# Patient Record
Sex: FEMALE | Race: WHITE | Employment: UNEMPLOYED | URBAN - METROPOLITAN AREA
[De-identification: names, ages, dates, MRNs, and addresses within clinical notes are randomized per-mention and may not be internally consistent; named-entity substitution may affect disease eponyms.]

---

## 2017-01-01 ENCOUNTER — HOSPITAL ENCOUNTER (INPATIENT)
Age: 47
LOS: 3 days | Discharge: HOSPICE | DRG: 064 | End: 2017-04-25
Attending: HOSPITALIST | Admitting: HOSPITALIST
Payer: SELF-PAY

## 2017-01-01 ENCOUNTER — HOME CARE VISIT (OUTPATIENT)
Dept: HOSPICE | Facility: HOSPICE | Age: 47
End: 2017-01-01

## 2017-01-01 ENCOUNTER — HOSPICE ADMISSION (OUTPATIENT)
Dept: HOSPICE | Facility: HOSPICE | Age: 47
End: 2017-01-01

## 2017-01-01 ENCOUNTER — HOME CARE VISIT (OUTPATIENT)
Dept: SCHEDULING | Facility: HOME HEALTH | Age: 47
End: 2017-01-01

## 2017-01-01 ENCOUNTER — APPOINTMENT (OUTPATIENT)
Dept: CT IMAGING | Age: 47
DRG: 064 | End: 2017-01-01
Attending: PSYCHIATRY & NEUROLOGY
Payer: SELF-PAY

## 2017-01-01 ENCOUNTER — OFFICE VISIT (OUTPATIENT)
Dept: FAMILY MEDICINE CLINIC | Age: 47
End: 2017-01-01

## 2017-01-01 ENCOUNTER — HOSPITAL ENCOUNTER (OUTPATIENT)
Dept: LAB | Age: 47
Discharge: HOME | End: 2017-04-10

## 2017-01-01 ENCOUNTER — TELEPHONE (OUTPATIENT)
Dept: NEUROLOGY | Age: 47
End: 2017-01-01

## 2017-01-01 ENCOUNTER — APPOINTMENT (OUTPATIENT)
Dept: MRI IMAGING | Age: 47
DRG: 064 | End: 2017-01-01
Attending: HOSPITALIST
Payer: SELF-PAY

## 2017-01-01 VITALS
TEMPERATURE: 98.2 F | RESPIRATION RATE: 14 BRPM | BODY MASS INDEX: 20.88 KG/M2 | SYSTOLIC BLOOD PRESSURE: 127 MMHG | WEIGHT: 133 LBS | OXYGEN SATURATION: 96 % | DIASTOLIC BLOOD PRESSURE: 76 MMHG | HEART RATE: 71 BPM | HEIGHT: 67 IN

## 2017-01-01 VITALS
RESPIRATION RATE: 20 BRPM | SYSTOLIC BLOOD PRESSURE: 142 MMHG | HEIGHT: 67 IN | OXYGEN SATURATION: 96 % | HEART RATE: 75 BPM | DIASTOLIC BLOOD PRESSURE: 82 MMHG | TEMPERATURE: 97.1 F | WEIGHT: 133 LBS | BODY MASS INDEX: 20.88 KG/M2

## 2017-01-01 VITALS
OXYGEN SATURATION: 94 % | HEART RATE: 121 BPM | TEMPERATURE: 97.6 F | SYSTOLIC BLOOD PRESSURE: 128 MMHG | DIASTOLIC BLOOD PRESSURE: 100 MMHG | RESPIRATION RATE: 16 BRPM

## 2017-01-01 VITALS
RESPIRATION RATE: 16 BRPM | BODY MASS INDEX: 21.38 KG/M2 | HEIGHT: 66 IN | TEMPERATURE: 97.8 F | HEART RATE: 79 BPM | OXYGEN SATURATION: 100 % | SYSTOLIC BLOOD PRESSURE: 117 MMHG | DIASTOLIC BLOOD PRESSURE: 80 MMHG | WEIGHT: 133 LBS

## 2017-01-01 DIAGNOSIS — I15.9 SECONDARY HYPERTENSION: ICD-10-CM

## 2017-01-01 DIAGNOSIS — I10 ESSENTIAL HYPERTENSION: ICD-10-CM

## 2017-01-01 DIAGNOSIS — I63.512 ACUTE ISCHEMIC LEFT MCA STROKE (HCC): ICD-10-CM

## 2017-01-01 DIAGNOSIS — R53.81 DEBILITY: ICD-10-CM

## 2017-01-01 DIAGNOSIS — R11.14 BILIOUS VOMITING WITH NAUSEA: ICD-10-CM

## 2017-01-01 DIAGNOSIS — R63.30 FEEDING DIFFICULTIES: ICD-10-CM

## 2017-01-01 DIAGNOSIS — I10 ESSENTIAL HYPERTENSION: Primary | ICD-10-CM

## 2017-01-01 DIAGNOSIS — I63.232 CEREBRAL INFARCTION DUE TO OCCLUSION OF LEFT INTERNAL CAROTID ARTERY (HCC): ICD-10-CM

## 2017-01-01 LAB
ABO + RH BLD: NORMAL
ALBUMIN SERPL BCP-MCNC: 3.4 G/DL (ref 3.4–5)
ALBUMIN SERPL BCP-MCNC: 4.3 G/DL (ref 3.4–5)
ALBUMIN/GLOB SERPL: 1.3 {RATIO} (ref 0.8–1.7)
ALBUMIN/GLOB SERPL: 1.4 {RATIO} (ref 0.8–1.7)
ALP SERPL-CCNC: 107 U/L (ref 45–117)
ALP SERPL-CCNC: 132 U/L (ref 45–117)
ALT SERPL-CCNC: 61 U/L (ref 13–56)
ALT SERPL-CCNC: 62 U/L (ref 13–56)
ANION GAP BLD CALC-SCNC: 10 MMOL/L (ref 3–18)
ANION GAP BLD CALC-SCNC: 10 MMOL/L (ref 3–18)
APTT PPP: 42.7 SEC (ref 23–36.4)
AST SERPL W P-5'-P-CCNC: 42 U/L (ref 15–37)
AST SERPL W P-5'-P-CCNC: 64 U/L (ref 15–37)
BASOPHILS # BLD AUTO: 0 K/UL (ref 0–0.06)
BASOPHILS # BLD AUTO: 0 K/UL (ref 0–0.06)
BASOPHILS # BLD: 0 % (ref 0–2)
BASOPHILS # BLD: 0 % (ref 0–2)
BILIRUB SERPL-MCNC: 0.7 MG/DL (ref 0.2–1)
BILIRUB SERPL-MCNC: 0.7 MG/DL (ref 0.2–1)
BLD PROD TYP BPU: NORMAL
BLD PROD TYP BPU: NORMAL
BLOOD GROUP ANTIBODIES SERPL: NORMAL
BPU ID: NORMAL
BPU ID: NORMAL
BUN SERPL-MCNC: 10 MG/DL (ref 7–18)
BUN SERPL-MCNC: 7 MG/DL (ref 7–18)
BUN/CREAT SERPL: 10 (ref 12–20)
BUN/CREAT SERPL: 12 (ref 12–20)
CALCIUM SERPL-MCNC: 8.3 MG/DL (ref 8.5–10.1)
CALCIUM SERPL-MCNC: 8.8 MG/DL (ref 8.5–10.1)
CHLORIDE SERPL-SCNC: 104 MMOL/L (ref 100–108)
CHLORIDE SERPL-SCNC: 96 MMOL/L (ref 100–108)
CHOLEST SERPL-MCNC: 198 MG/DL
CHOLEST SERPL-MCNC: 234 MG/DL
CO2 SERPL-SCNC: 24 MMOL/L (ref 21–32)
CO2 SERPL-SCNC: 29 MMOL/L (ref 21–32)
CREAT SERPL-MCNC: 0.72 MG/DL (ref 0.6–1.3)
CREAT SERPL-MCNC: 0.82 MG/DL (ref 0.6–1.3)
CRP SERPL HS-MCNC: 77.44 MG/L (ref 0–3)
DIFFERENTIAL METHOD BLD: ABNORMAL
DIFFERENTIAL METHOD BLD: ABNORMAL
EOSINOPHIL # BLD: 0 K/UL (ref 0–0.4)
EOSINOPHIL # BLD: 0.3 K/UL (ref 0–0.4)
EOSINOPHIL NFR BLD: 0 % (ref 0–5)
EOSINOPHIL NFR BLD: 3 % (ref 0–5)
ERYTHROCYTE [DISTWIDTH] IN BLOOD BY AUTOMATED COUNT: 13.7 % (ref 11.6–14.5)
ERYTHROCYTE [DISTWIDTH] IN BLOOD BY AUTOMATED COUNT: 13.9 % (ref 11.6–14.5)
ERYTHROCYTE [SEDIMENTATION RATE] IN BLOOD: 10 MM/HR (ref 0–20)
EST. AVERAGE GLUCOSE BLD GHB EST-MCNC: 103 MG/DL
EST. AVERAGE GLUCOSE BLD GHB EST-MCNC: 105 MG/DL
GLOBULIN SER CALC-MCNC: 2.7 G/DL (ref 2–4)
GLOBULIN SER CALC-MCNC: 3.1 G/DL (ref 2–4)
GLUCOSE BLD STRIP.AUTO-MCNC: 113 MG/DL (ref 70–110)
GLUCOSE BLD STRIP.AUTO-MCNC: 120 MG/DL (ref 70–110)
GLUCOSE SERPL-MCNC: 91 MG/DL (ref 74–99)
GLUCOSE SERPL-MCNC: 95 MG/DL (ref 74–99)
HBA1C MFR BLD: 5.2 % (ref 4.2–5.6)
HBA1C MFR BLD: 5.3 % (ref 4.2–5.6)
HCT VFR BLD AUTO: 36.8 % (ref 35–45)
HCT VFR BLD AUTO: 45.1 % (ref 35–45)
HDLC SERPL-MCNC: 41 MG/DL (ref 40–60)
HDLC SERPL-MCNC: 48 MG/DL (ref 40–60)
HDLC SERPL: 4.8 {RATIO} (ref 0–5)
HDLC SERPL: 4.9 {RATIO} (ref 0–5)
HGB BLD-MCNC: 12.8 G/DL (ref 12–16)
HGB BLD-MCNC: 15.6 G/DL (ref 12–16)
INR PPP: 1.2 (ref 0.8–1.2)
LDLC SERPL CALC-MCNC: 131.4 MG/DL (ref 0–100)
LDLC SERPL CALC-MCNC: 159.8 MG/DL (ref 0–100)
LIPID PROFILE,FLP: ABNORMAL
LIPID PROFILE,FLP: ABNORMAL
LYMPHOCYTES # BLD AUTO: 12 % (ref 21–52)
LYMPHOCYTES # BLD AUTO: 19 % (ref 21–52)
LYMPHOCYTES # BLD: 1.6 K/UL (ref 0.9–3.6)
LYMPHOCYTES # BLD: 1.9 K/UL (ref 0.9–3.6)
MAGNESIUM SERPL-MCNC: 2 MG/DL (ref 1.6–2.6)
MCH RBC QN AUTO: 38.1 PG (ref 24–34)
MCH RBC QN AUTO: 38.7 PG (ref 24–34)
MCHC RBC AUTO-ENTMCNC: 34.6 G/DL (ref 31–37)
MCHC RBC AUTO-ENTMCNC: 34.8 G/DL (ref 31–37)
MCV RBC AUTO: 109.5 FL (ref 74–97)
MCV RBC AUTO: 111.9 FL (ref 74–97)
MONOCYTES # BLD: 0.7 K/UL (ref 0.05–1.2)
MONOCYTES # BLD: 0.7 K/UL (ref 0.05–1.2)
MONOCYTES NFR BLD AUTO: 5 % (ref 3–10)
MONOCYTES NFR BLD AUTO: 7 % (ref 3–10)
NEUTS SEG # BLD: 10.9 K/UL (ref 1.8–8)
NEUTS SEG # BLD: 7.1 K/UL (ref 1.8–8)
NEUTS SEG NFR BLD AUTO: 71 % (ref 40–73)
NEUTS SEG NFR BLD AUTO: 83 % (ref 40–73)
PHOSPHATE SERPL-MCNC: 1.9 MG/DL (ref 2.5–4.9)
PLATELET # BLD AUTO: 318 K/UL (ref 135–420)
PLATELET # BLD AUTO: 390 K/UL (ref 135–420)
PMV BLD AUTO: 10.8 FL (ref 9.2–11.8)
PMV BLD AUTO: 9.6 FL (ref 9.2–11.8)
POTASSIUM SERPL-SCNC: 3.6 MMOL/L (ref 3.5–5.5)
POTASSIUM SERPL-SCNC: 4 MMOL/L (ref 3.5–5.5)
PROT SERPL-MCNC: 6.1 G/DL (ref 6.4–8.2)
PROT SERPL-MCNC: 7.4 G/DL (ref 6.4–8.2)
PROTHROMBIN TIME: 14.6 SEC (ref 11.5–15.2)
RBC # BLD AUTO: 3.36 M/UL (ref 4.2–5.3)
RBC # BLD AUTO: 4.03 M/UL (ref 4.2–5.3)
SODIUM SERPL-SCNC: 135 MMOL/L (ref 136–145)
SODIUM SERPL-SCNC: 138 MMOL/L (ref 136–145)
SODIUM SERPL-SCNC: 143 MMOL/L (ref 136–145)
SPECIMEN EXP DATE BLD: NORMAL
STATUS OF UNIT,%ST: NORMAL
STATUS OF UNIT,%ST: NORMAL
T3FREE SERPL-MCNC: 1.8 PG/ML (ref 2.3–4.2)
T4 FREE SERPL-MCNC: 0.8 NG/DL (ref 0.7–1.5)
TRIGL SERPL-MCNC: 128 MG/DL (ref ?–150)
TRIGL SERPL-MCNC: 131 MG/DL (ref ?–150)
TSH SERPL DL<=0.05 MIU/L-ACNC: 0.66 UIU/ML (ref 0.36–3.74)
TSH SERPL DL<=0.05 MIU/L-ACNC: 2.9 UIU/ML (ref 0.36–3.74)
UNIT DIVISION, %UDIV: 0
UNIT DIVISION, %UDIV: 0
VLDLC SERPL CALC-MCNC: 25.6 MG/DL
VLDLC SERPL CALC-MCNC: 26.2 MG/DL
WBC # BLD AUTO: 10 K/UL (ref 4.6–13.2)
WBC # BLD AUTO: 13.3 K/UL (ref 4.6–13.2)

## 2017-01-01 PROCEDURE — 76450000000

## 2017-01-01 PROCEDURE — 84443 ASSAY THYROID STIM HORMONE: CPT | Performed by: FAMILY MEDICINE

## 2017-01-01 PROCEDURE — 83036 HEMOGLOBIN GLYCOSYLATED A1C: CPT | Performed by: FAMILY MEDICINE

## 2017-01-01 PROCEDURE — 0651 HSPC ROUTINE HOME CARE

## 2017-01-01 PROCEDURE — 36415 COLL VENOUS BLD VENIPUNCTURE: CPT | Performed by: HOSPITALIST

## 2017-01-01 PROCEDURE — 65270000029 HC RM PRIVATE

## 2017-01-01 PROCEDURE — 83036 HEMOGLOBIN GLYCOSYLATED A1C: CPT | Performed by: HOSPITALIST

## 2017-01-01 PROCEDURE — 74011000258 HC RX REV CODE- 258: Performed by: HOSPITALIST

## 2017-01-01 PROCEDURE — G0156 HHCP-SVS OF AIDE,EA 15 MIN: HCPCS

## 2017-01-01 PROCEDURE — 84439 ASSAY OF FREE THYROXINE: CPT | Performed by: HOSPITALIST

## 2017-01-01 PROCEDURE — 82962 GLUCOSE BLOOD TEST: CPT

## 2017-01-01 PROCEDURE — 85730 THROMBOPLASTIN TIME PARTIAL: CPT | Performed by: HOSPITALIST

## 2017-01-01 PROCEDURE — 70551 MRI BRAIN STEM W/O DYE: CPT

## 2017-01-01 PROCEDURE — 84481 FREE ASSAY (FT-3): CPT | Performed by: HOSPITALIST

## 2017-01-01 PROCEDURE — C9113 INJ PANTOPRAZOLE SODIUM, VIA: HCPCS | Performed by: HOSPITALIST

## 2017-01-01 PROCEDURE — 77030018719 HC DRSG PTCH ANTIMIC J&J -A

## 2017-01-01 PROCEDURE — 74011250636 HC RX REV CODE- 250/636: Performed by: HOSPITALIST

## 2017-01-01 PROCEDURE — 85025 COMPLETE CBC W/AUTO DIFF WBC: CPT | Performed by: FAMILY MEDICINE

## 2017-01-01 PROCEDURE — G0155 HHCP-SVS OF CSW,EA 15 MIN: HCPCS

## 2017-01-01 PROCEDURE — C1751 CATH, INF, PER/CENT/MIDLINE: HCPCS

## 2017-01-01 PROCEDURE — 85652 RBC SED RATE AUTOMATED: CPT | Performed by: HOSPITALIST

## 2017-01-01 PROCEDURE — 86900 BLOOD TYPING SEROLOGIC ABO: CPT | Performed by: HOSPITALIST

## 2017-01-01 PROCEDURE — 80053 COMPREHEN METABOLIC PANEL: CPT | Performed by: HOSPITALIST

## 2017-01-01 PROCEDURE — A6212 FOAM DRG <=16 SQ IN W/BORDER: HCPCS

## 2017-01-01 PROCEDURE — 77030020245 HC SOL INJ 5% D/0.9%NACL

## 2017-01-01 PROCEDURE — G0299 HHS/HOSPICE OF RN EA 15 MIN: HCPCS

## 2017-01-01 PROCEDURE — 84443 ASSAY THYROID STIM HORMONE: CPT | Performed by: HOSPITALIST

## 2017-01-01 PROCEDURE — 80061 LIPID PANEL: CPT | Performed by: FAMILY MEDICINE

## 2017-01-01 PROCEDURE — 84295 ASSAY OF SERUM SODIUM: CPT | Performed by: HOSPITALIST

## 2017-01-01 PROCEDURE — HOSPICE MEDICATION HC HH HOSPICE MEDICATION

## 2017-01-01 PROCEDURE — 36569 INSJ PICC 5 YR+ W/O IMAGING: CPT | Performed by: HOSPITALIST

## 2017-01-01 PROCEDURE — 76937 US GUIDE VASCULAR ACCESS: CPT | Performed by: HOSPITALIST

## 2017-01-01 PROCEDURE — 74011000250 HC RX REV CODE- 250: Performed by: HOSPITALIST

## 2017-01-01 PROCEDURE — 92610 EVALUATE SWALLOWING FUNCTION: CPT

## 2017-01-01 PROCEDURE — 83735 ASSAY OF MAGNESIUM: CPT | Performed by: HOSPITALIST

## 2017-01-01 PROCEDURE — 80061 LIPID PANEL: CPT | Performed by: HOSPITALIST

## 2017-01-01 PROCEDURE — 84100 ASSAY OF PHOSPHORUS: CPT | Performed by: HOSPITALIST

## 2017-01-01 PROCEDURE — 70450 CT HEAD/BRAIN W/O DYE: CPT

## 2017-01-01 PROCEDURE — 85025 COMPLETE CBC W/AUTO DIFF WBC: CPT | Performed by: HOSPITALIST

## 2017-01-01 PROCEDURE — 85610 PROTHROMBIN TIME: CPT | Performed by: HOSPITALIST

## 2017-01-01 PROCEDURE — 77030018786 HC NDL GD F/USND BARD -B

## 2017-01-01 PROCEDURE — 65610000009 HC RM ICU NEURO

## 2017-01-01 PROCEDURE — 3336500001 HSPC ELECTION

## 2017-01-01 PROCEDURE — 80053 COMPREHEN METABOLIC PANEL: CPT | Performed by: FAMILY MEDICINE

## 2017-01-01 PROCEDURE — 74011250637 HC RX REV CODE- 250/637: Performed by: HOSPITALIST

## 2017-01-01 PROCEDURE — 86141 C-REACTIVE PROTEIN HS: CPT | Performed by: HOSPITALIST

## 2017-01-01 RX ORDER — BACITRACIN 500 UNIT/G
1 PACKET (EA) TOPICAL AS NEEDED
Status: DISCONTINUED | OUTPATIENT
Start: 2017-01-01 | End: 2017-01-01

## 2017-01-01 RX ORDER — ACETAMINOPHEN 325 MG/1
650 TABLET ORAL
Status: DISCONTINUED | OUTPATIENT
Start: 2017-01-01 | End: 2017-01-01

## 2017-01-01 RX ORDER — HYDROCHLOROTHIAZIDE 12.5 MG/1
25 CAPSULE ORAL DAILY
COMMUNITY
End: 2017-01-01

## 2017-01-01 RX ORDER — DEXTROSE MONOHYDRATE AND SODIUM CHLORIDE 5; .9 G/100ML; G/100ML
0.75 INJECTION, SOLUTION INTRAVENOUS CONTINUOUS
Status: DISCONTINUED | OUTPATIENT
Start: 2017-01-01 | End: 2017-01-01

## 2017-01-01 RX ORDER — SCOLOPAMINE TRANSDERMAL SYSTEM 1 MG/1
1.5 PATCH, EXTENDED RELEASE TRANSDERMAL
Status: DISCONTINUED | OUTPATIENT
Start: 2017-01-01 | End: 2017-01-01 | Stop reason: HOSPADM

## 2017-01-01 RX ORDER — ACETAMINOPHEN 650 MG/1
650 SUPPOSITORY RECTAL
Status: DISCONTINUED | OUTPATIENT
Start: 2017-01-01 | End: 2017-01-01 | Stop reason: HOSPADM

## 2017-01-01 RX ORDER — PROMETHAZINE HYDROCHLORIDE 25 MG/1
25 TABLET ORAL
COMMUNITY
End: 2017-01-01

## 2017-01-01 RX ORDER — GLYCOPYRROLATE 0.2 MG/ML
0.2 INJECTION INTRAMUSCULAR; INTRAVENOUS
Status: DISCONTINUED | OUTPATIENT
Start: 2017-01-01 | End: 2017-01-01 | Stop reason: HOSPADM

## 2017-01-01 RX ORDER — SCOLOPAMINE TRANSDERMAL SYSTEM 1 MG/1
1.5 PATCH, EXTENDED RELEASE TRANSDERMAL
Qty: 10 PATCH | Refills: 0 | Status: SHIPPED | OUTPATIENT
Start: 2017-01-01

## 2017-01-01 RX ORDER — LIDOCAINE HYDROCHLORIDE 10 MG/ML
1-5 INJECTION INFILTRATION; PERINEURAL
Status: COMPLETED | OUTPATIENT
Start: 2017-01-01 | End: 2017-01-01

## 2017-01-01 RX ORDER — ALPRAZOLAM 1 MG/1
1 TABLET ORAL 3 TIMES DAILY
COMMUNITY
End: 2017-01-01

## 2017-01-01 RX ORDER — ONDANSETRON 2 MG/ML
1 INJECTION INTRAMUSCULAR; INTRAVENOUS
Status: DISCONTINUED | OUTPATIENT
Start: 2017-01-01 | End: 2017-01-01

## 2017-01-01 RX ORDER — AMOXICILLIN 250 MG
2 CAPSULE ORAL
Status: DISCONTINUED | OUTPATIENT
Start: 2017-01-01 | End: 2017-01-01

## 2017-01-01 RX ORDER — ALBUTEROL SULFATE 0.83 MG/ML
2.5 SOLUTION RESPIRATORY (INHALATION)
Status: DISCONTINUED | OUTPATIENT
Start: 2017-01-01 | End: 2017-01-01

## 2017-01-01 RX ORDER — SODIUM CHLORIDE 0.9 % (FLUSH) 0.9 %
20 SYRINGE (ML) INJECTION EVERY 24 HOURS
Status: DISCONTINUED | OUTPATIENT
Start: 2017-01-01 | End: 2017-01-01

## 2017-01-01 RX ORDER — PROMETHAZINE HYDROCHLORIDE 25 MG/1
25 TABLET ORAL
Qty: 20 TAB | Refills: 0 | Status: SHIPPED | OUTPATIENT
Start: 2017-01-01 | End: 2017-01-01

## 2017-01-01 RX ORDER — MANNITOL 20 G/100ML
76.3 INJECTION, SOLUTION INTRAVENOUS CONTINUOUS
Status: DISCONTINUED | OUTPATIENT
Start: 2017-01-01 | End: 2017-01-01

## 2017-01-01 RX ORDER — HALOPERIDOL 2 MG/ML
2 SOLUTION ORAL
Qty: 30 ML | Refills: 0 | Status: SHIPPED | OUTPATIENT
Start: 2017-01-01

## 2017-01-01 RX ORDER — ZOLPIDEM TARTRATE 10 MG/1
TABLET ORAL
COMMUNITY
End: 2017-01-01

## 2017-01-01 RX ORDER — SODIUM CHLORIDE 0.9 % (FLUSH) 0.9 %
10-30 SYRINGE (ML) INJECTION AS NEEDED
Status: DISCONTINUED | OUTPATIENT
Start: 2017-01-01 | End: 2017-01-01

## 2017-01-01 RX ORDER — SODIUM CHLORIDE 0.9 % (FLUSH) 0.9 %
10 SYRINGE (ML) INJECTION EVERY 24 HOURS
Status: DISCONTINUED | OUTPATIENT
Start: 2017-01-01 | End: 2017-01-01

## 2017-01-01 RX ORDER — POTASSIUM CHLORIDE 7.45 MG/ML
10 INJECTION INTRAVENOUS
Status: DISCONTINUED | OUTPATIENT
Start: 2017-01-01 | End: 2017-01-01

## 2017-01-01 RX ORDER — ASPIRIN 325 MG
325 TABLET ORAL DAILY
Status: DISCONTINUED | OUTPATIENT
Start: 2017-01-01 | End: 2017-01-01

## 2017-01-01 RX ORDER — POTASSIUM CHLORIDE 29.8 MG/ML
20 INJECTION INTRAVENOUS
Status: DISCONTINUED | OUTPATIENT
Start: 2017-01-01 | End: 2017-01-01

## 2017-01-01 RX ORDER — MORPHINE SULFATE 20 MG/ML
10 SOLUTION ORAL
Status: DISCONTINUED | OUTPATIENT
Start: 2017-01-01 | End: 2017-01-01

## 2017-01-01 RX ORDER — SODIUM CHLORIDE 0.9 % (FLUSH) 0.9 %
10-40 SYRINGE (ML) INJECTION EVERY 8 HOURS
Status: DISCONTINUED | OUTPATIENT
Start: 2017-01-01 | End: 2017-01-01

## 2017-01-01 RX ORDER — MORPHINE SULFATE 20 MG/ML
5 SOLUTION ORAL
Status: DISCONTINUED | OUTPATIENT
Start: 2017-01-01 | End: 2017-01-01 | Stop reason: HOSPADM

## 2017-01-01 RX ORDER — POTASSIUM CHLORIDE, DEXTROSE MONOHYDRATE AND SODIUM CHLORIDE 300; 5; 900 MG/100ML; G/100ML; MG/100ML
INJECTION, SOLUTION INTRAVENOUS CONTINUOUS
Status: DISCONTINUED | OUTPATIENT
Start: 2017-01-01 | End: 2017-01-01

## 2017-01-01 RX ORDER — PROMETHAZINE HYDROCHLORIDE 25 MG/1
25 SUPPOSITORY RECTAL
Qty: 30 SUPPOSITORY | Refills: 0 | Status: SHIPPED | OUTPATIENT
Start: 2017-01-01 | End: 2017-05-02

## 2017-01-01 RX ORDER — SODIUM CHLORIDE 0.9 % (FLUSH) 0.9 %
10 SYRINGE (ML) INJECTION AS NEEDED
Status: DISCONTINUED | OUTPATIENT
Start: 2017-01-01 | End: 2017-01-01

## 2017-01-01 RX ORDER — ATORVASTATIN CALCIUM 40 MG/1
80 TABLET, FILM COATED ORAL
Status: DISCONTINUED | OUTPATIENT
Start: 2017-01-01 | End: 2017-01-01

## 2017-01-01 RX ORDER — HYDROCHLOROTHIAZIDE 25 MG/1
25 TABLET ORAL DAILY
Qty: 30 TAB | Refills: 5 | Status: SHIPPED | OUTPATIENT
Start: 2017-01-01 | End: 2017-01-01

## 2017-01-01 RX ORDER — LORAZEPAM 2 MG/ML
1 CONCENTRATE ORAL
Status: DISCONTINUED | OUTPATIENT
Start: 2017-01-01 | End: 2017-01-01 | Stop reason: HOSPADM

## 2017-01-01 RX ORDER — ONDANSETRON 2 MG/ML
4 INJECTION INTRAMUSCULAR; INTRAVENOUS
Status: DISCONTINUED | OUTPATIENT
Start: 2017-01-01 | End: 2017-01-01 | Stop reason: HOSPADM

## 2017-01-01 RX ORDER — HALOPERIDOL 2 MG/ML
2 SOLUTION ORAL
Status: DISCONTINUED | OUTPATIENT
Start: 2017-01-01 | End: 2017-01-01 | Stop reason: HOSPADM

## 2017-01-01 RX ORDER — ENOXAPARIN SODIUM 100 MG/ML
40 INJECTION SUBCUTANEOUS EVERY 24 HOURS
Status: DISCONTINUED | OUTPATIENT
Start: 2017-01-01 | End: 2017-01-01

## 2017-01-01 RX ORDER — MORPHINE SULFATE 20 MG/ML
5 SOLUTION ORAL
Qty: 30 ML | Refills: 0 | Status: SHIPPED | OUTPATIENT
Start: 2017-01-01

## 2017-01-01 RX ORDER — LORAZEPAM 2 MG/ML
1 CONCENTRATE ORAL
Qty: 30 ML | Refills: 0 | Status: SHIPPED | OUTPATIENT
Start: 2017-01-01

## 2017-01-01 RX ORDER — PROMETHAZINE HYDROCHLORIDE 25 MG/1
25 SUPPOSITORY RECTAL
Status: DISCONTINUED | OUTPATIENT
Start: 2017-01-01 | End: 2017-01-01 | Stop reason: HOSPADM

## 2017-01-01 RX ORDER — HYDROCHLOROTHIAZIDE 25 MG/1
25 TABLET ORAL DAILY
Qty: 30 TAB | Refills: 0 | Status: SHIPPED | OUTPATIENT
Start: 2017-01-01 | End: 2017-01-01 | Stop reason: SDUPTHER

## 2017-01-01 RX ORDER — SERTRALINE HYDROCHLORIDE 100 MG/1
100 TABLET, FILM COATED ORAL 2 TIMES DAILY
COMMUNITY
End: 2017-01-01

## 2017-01-01 RX ADMIN — SODIUM CHLORIDE 40 MG: 9 INJECTION INTRAMUSCULAR; INTRAVENOUS; SUBCUTANEOUS at 09:27

## 2017-01-01 RX ADMIN — Medication 20 ML: at 14:00

## 2017-01-01 RX ADMIN — MANNITOL: 20 INJECTION, SOLUTION INTRAVENOUS at 01:33

## 2017-01-01 RX ADMIN — LIDOCAINE HYDROCHLORIDE 2 ML: 10 INJECTION, SOLUTION INFILTRATION; PERINEURAL at 12:40

## 2017-01-01 RX ADMIN — FOLIC ACID: 5 INJECTION, SOLUTION INTRAMUSCULAR; INTRAVENOUS; SUBCUTANEOUS at 09:00

## 2017-01-01 RX ADMIN — DEXTROSE MONOHYDRATE, SODIUM CHLORIDE, AND POTASSIUM CHLORIDE 1000 ML: 50; 9; 2.98 INJECTION, SOLUTION INTRAVENOUS at 09:27

## 2017-01-01 RX ADMIN — ENOXAPARIN SODIUM 40 MG: 40 INJECTION SUBCUTANEOUS at 00:22

## 2017-01-01 RX ADMIN — POTASSIUM CHLORIDE 10 MEQ: 7.46 INJECTION, SOLUTION INTRAVENOUS at 09:27

## 2017-01-01 RX ADMIN — POTASSIUM CHLORIDE 10 MEQ: 7.46 INJECTION, SOLUTION INTRAVENOUS at 12:00

## 2017-01-01 RX ADMIN — DEXTROSE MONOHYDRATE AND SODIUM CHLORIDE 0.74 ML/KG/HR: 5; .9 INJECTION, SOLUTION INTRAVENOUS at 06:34

## 2017-01-01 RX ADMIN — SODIUM CHLORIDE 0.25 ML/KG/HR: 3 INJECTION, SOLUTION INTRAVENOUS at 06:32

## 2017-01-01 RX ADMIN — POTASSIUM CHLORIDE 10 MEQ: 7.46 INJECTION, SOLUTION INTRAVENOUS at 13:00

## 2017-01-01 RX ADMIN — SODIUM CHLORIDE 40 MG: 9 INJECTION INTRAMUSCULAR; INTRAVENOUS; SUBCUTANEOUS at 00:22

## 2017-01-01 RX ADMIN — DEXTROSE MONOHYDRATE AND SODIUM CHLORIDE 0.75 ML/KG/HR: 5; .9 INJECTION, SOLUTION INTRAVENOUS at 22:28

## 2017-01-01 RX ADMIN — Medication 10 ML: at 14:00

## 2017-02-17 NOTE — TELEPHONE ENCOUNTER
Requesting Hydrochlorothiazide refill to carry her over until she reports to [facility] as a walk-in. on  2/27/17  .

## 2017-04-10 PROBLEM — I10 ESSENTIAL HYPERTENSION: Status: ACTIVE | Noted: 2017-01-01

## 2017-04-10 NOTE — PROGRESS NOTES
PHQ 6- Active diagnosis of depression-  In treatment    No chief complaint on file. 1. Have you been to the ER, urgent care clinic since your last visit? Hospitalized since your last visit? No    2. Have you seen or consulted any other health care providers outside of the [address] [place] [address] since your last visit? No    3. When was your last Pap smear? 5/2016 - No results of file    When was your last Mammogram? 5/2016  When was your last Colon screening? 2012    PMH/FH/Social Hx reviewed and updated as needed      Applicable screenings reviewed and updated as needed  Medication reconciliation performed. Patient does need medication refills. Health Maintenance reviewed.       * Patient to complete VICKY

## 2017-04-10 NOTE — PATIENT INSTRUCTIONS
Winnie Stafford have been referred for specialty care at  [address] [place] [address] in [place]. Services are provided at a sliding scale rate based on your income. Please call [phone] or [phone] to start the screening process. You can leave them a message with your information, and you will receive a call back. They will only try to contact you two times, so if you miss the call or have not heard from them 2 weeks after your call, please call and leave another message. Once you receive your letter of acceptance, bring it to the [place] [address] and we can schedule your appointment. Call [employer] at [phone]. They will screen you for any insurance you might qualify for. This is the first step before you can receive discounts at the [facility] or [employer] specialists. Additional contact numbers for [facility] are below:   For [place]/[facility] patients: [phone]  For [place]/[facility] patients: [phone]  For [place]/[facility] patients: [phone]

## 2017-04-10 NOTE — MR AVS SNAPSHOT
Visit Information Date & Time Provider Department Dept. Phone Encounter #  
 4/10/2017  9:30 AM Laura Bethea [place] [address] [phone] [id number] Upcoming Health Maintenance Date Due Pneumococcal 19-64 Medium Risk (1 of 1 - PPSV23) 4/16/1987 DTaP/Tdap/Td series (1 - Tdap) 4/16/1989 PAP AKA CERVICAL CYTOLOGY 4/16/1989 INFLUENZA AGE 9 TO ADULT 8/1/2016 Allergies as of 4/10/2017  Review Complete On: 4/10/2017 By: Meghan Mtz Severity Noted Reaction Type Reactions Lisinopril  10/24/2016    Cough Penicillins  10/24/2016    Rash Current Immunizations  Never Reviewed No immunizations on file. Not reviewed this visit You Were Diagnosed With   
  
 Codes Comments Essential hypertension    -  Primary ICD-10-CM: I10 
ICD-9-CM: 401.9 Secondary hypertension     ICD-10-CM: I15.9 ICD-9-CM: 405.99 Bilious vomiting with nausea     ICD-10-CM: R11.14 
ICD-9-CM: 787.04 Vitals BP Pulse Temp Resp Height(growth percentile) Weight(growth percentile) 117/80 (BP 1 Location: Right arm, BP Patient Position: Sitting) 79 97.8 °F (36.6 °C) 16 5' 6\" (1.676 m) 133 lb (60.3 kg) LMP SpO2 BMI OB Status Smoking Status 03/28/2017 (Approximate) 100% 21.47 kg/m2 Premenopausal Current Every Day Smoker BMI and BSA Data Body Mass Index Body Surface Area  
 21.47 kg/m 2 1.68 m 2 Preferred Pharmacy Pharmacy Name Phone Jennifer Teresa, [address] [facility] [phone] Your Updated Medication List  
  
   
This list is accurate as of: 4/10/17 10:04 AM.  Always use your most recent med list.  
  
  
  
  
 AMBIEN 10 mg tablet Generic drug:  zolpidem Take  by mouth nightly as needed. hydroCHLOROthiazide 25 mg tablet Commonly known as:  HYDRODIURIL Take 1 Tab by mouth daily. LaMICtal 100 mg tablet Generic drug:  lamoTRIgine Take 200 mg by mouth daily. promethazine 25 mg tablet Commonly known as:  PHENERGAN Take 1 Tab by mouth every six (6) hours as needed for Nausea. XANAX 0.5 mg tablet Generic drug:  ALPRAZolam  
Take  by mouth. ZOLOFT 100 mg tablet Generic drug:  sertraline Take  by mouth daily. Prescriptions Sent to Pharmacy Refills  
 hydroCHLOROthiazide (HYDRODIURIL) 25 mg tablet 5 Sig: Take 1 Tab by mouth daily. Class: Normal  
 Pharmacy: [address] [place] [address] Saul [facility] [address] [facility] Ph #: [phone] Route: Oral  
 promethazine (PHENERGAN) 25 mg tablet 0 Sig: Take 1 Tab by mouth every six (6) hours as needed for Nausea. Class: Normal  
 Pharmacy: [address] [place] [address] [facility] [address] [facility] Ph #: [phone] Route: Oral  
  
Patient Instructions Sidney Barthel You have been referred for specialty care at  [address] [place] [address] in [place]. Services are provided at a sliding scale rate based on your income. Please call [phone] or [phone] to start the screening process. You can leave them a message with your information, and you will receive a call back. They will only try to contact you two times, so if you miss the call or have not heard from them 2 weeks after your call, please call and leave another message. Once you receive your letter of acceptance, bring it to the [place] [address] and we can schedule your appointment. Call [employer] at [phone]. They will screen you for any insurance you might qualify for. This is the first step before you can receive discounts at the hospital or [facility] specialists. Additional contact numbers for [facility] are below: For [place]/[facility] patients: [phone] For [place]/[facility] patients: [phone] For [place]/[facility] patients: [phone] Introducing [facility]! Anahi Adams introduces goOutMap patient portal. Now you can access parts of your medical record, email your doctor's office, and request medication refills online. 1. In your internet browser, go to [email or web address] 2. Click on the First Time User? Click Here link in the Sign In box. You will see the New Member Sign Up page. 3. Enter your goOutMap Access Code exactly as it appears below. You will not need to use this code after youve completed the sign-up process. If you do not sign up before the expiration date, you must request a new code. · goOutMap Access Code: [email or web address] Expires: 7/9/2017 10:04 AM 
 
4. Enter the last four digits of your Social Security Number (xxxx) and Date of Birth (mm/dd/yyyy) as indicated and click Submit. You will be taken to the next sign-up page. 5. Create a goOutMap ID. This will be your goOutMap login ID and cannot be changed, so think of one that is secure and easy to remember. 6. Create a goOutMap password. You can change your password at any time. 7. Enter your Password Reset Question and Answer. This can be used at a later time if you forget your password. 8. Enter your e-mail address. You will receive e-mail notification when new information is available in [address] 9. Click Sign Up. You can now view and download portions of your medical record. 10. Click the Download Summary menu link to download a portable copy of your medical information. If you have questions, please visit the Frequently Asked Questions section of the goOutMap website. Remember, goOutMap is NOT to be used for urgent needs. For medical emergencies, dial 911. Now available from your iPhone and Android! Please provide this summary of care documentation to your next provider. Your primary care clinician is listed as NONE. If you have any questions after today's visit, please call [phone].

## 2017-04-10 NOTE — PROGRESS NOTES
HPI  Nancy Rea is a 48 y.o. female being seen today for   Chief Complaint   Patient presents with    Follow Up Chronic Condition     Monitor and Medicate HTN   . she states that she vomited yesterday and nauseated since then but keeping down fluids. Needs bp med refill. Loads of stress lately due to finances and family issues. Also has history of elevated wbc. Per pt used to see hematologist but counts normalized and she has not followed up. Also has follow up gyn exam scheduled with [facility] due to cervical inflammation on her last exam.         Past Medical History:   Diagnosis Date    Alcoholism ([address]     Anemia NEC     Anxiety     Arthralgia     Constipation     Depression     Fatigue     for more than 11 years    Fibromyalgia     GERD (gastroesophageal reflux disease)     Hypercholesterolemia     Hypertension     Leukocytosis     Migraines     Nodule of left lung     on CT scan         ROS  Patient states that she is feeling well. Denies complaints of chest pain, shortness of breath, swelling of legs, dizziness or weakness. Current Outpatient Prescriptions   Medication Sig    hydroCHLOROthiazide (HYDRODIURIL) 25 mg tablet Take 1 Tab by mouth daily.  promethazine (PHENERGAN) 25 mg tablet Take 1 Tab by mouth every six (6) hours as needed for Nausea.  sertraline (ZOLOFT) 100 mg tablet Take  by mouth daily.  lamoTRIgine (LAMICTAL) 100 mg tablet Take 200 mg by mouth daily.  ALPRAZolam (XANAX) 0.5 mg tablet Take  by mouth.  zolpidem (AMBIEN) 10 mg tablet Take  by mouth nightly as needed. No current facility-administered medications for this visit.         PE  Visit Vitals    /80 (BP 1 Location: Right arm, BP Patient Position: Sitting)    Pulse 79    Temp 97.8 °F (36.6 °C)    Resp 16    Ht 5' 6\" (1.676 m)    Wt 133 lb (60.3 kg)    LMP 03/28/2017 (Approximate)    SpO2 100%    BMI 21.47 kg/m2        Alert and oriented with normal mood and affect. she is well developed and well nourished . Lungs are clear without wheezing. Heart rate is regular without murmurs or gallops. There is no lower extremity edema. No results found for this or any previous visit. Assessment and Plan:        ICD-10-CM ICD-9-CM    1. Essential hypertension X06 905.8 METABOLIC PANEL, COMPREHENSIVE      TSH 3RD GENERATION      LIPID PANEL      CBC WITH AUTOMATED DIFF      HEMOGLOBIN A1C WITH EAG   2. Secondary hypertension I15.9 405.99 hydroCHLOROthiazide (HYDRODIURIL) 25 mg tablet   3. Bilious vomiting with nausea R11.14 787.04 promethazine (PHENERGAN) 25 mg tablet     Refill bp med and phnergan. If nausea persists, consider cutting hctz in half until better. Start financial screening for bshsi gyn.   Pt will call us if she needs referral.       Timo Navas MD

## 2017-04-10 NOTE — PROGRESS NOTES
Discharge instructions reviewed with patient    Medication list and understanding of medications reviewed with patient. OTC and herbal medications reviewed and added to med list if applicable  Barriers to adherence assessed. Guidance given regarding new medications this visit, including reason for taking this medicine, and common side effects.     Labs drawn, Given AVS.

## 2017-04-22 PROBLEM — I63.512 ACUTE ISCHEMIC LEFT MCA STROKE (HCC): Status: ACTIVE | Noted: 2017-01-01

## 2017-04-22 PROBLEM — I63.522 ACUTE LEFT ACA ISCHEMIC STROKE (HCC): Status: ACTIVE | Noted: 2017-01-01

## 2017-04-22 PROBLEM — I63.232 CEREBRAL INFARCTION DUE TO OCCLUSION OF LEFT INTERNAL CAROTID ARTERY (HCC): Status: ACTIVE | Noted: 2017-01-01

## 2017-04-22 NOTE — IP AVS SNAPSHOT
Current Discharge Medication List  
  
START taking these medications Dose & Instructions Dispensing Information Comments Morning Noon Evening Bedtime  
 haloperidol 2 mg/mL oral concentrate Commonly known as:  HALDOL Your last dose was: Your next dose is:    
   
   
 Dose:  2 mg Take 1 mL by mouth every four (4) hours as needed. Quantity:  30 mL Refills:  0 LORazepam 2 mg/mL concentrated solution Commonly known as:  INTENSOL Your last dose was: Your next dose is:    
   
   
 Dose:  1 mg Take 0.5 mL by mouth every four (4) hours as needed. Max Daily Amount: 6 mg. Quantity:  30 mL Refills:  0  
     
   
   
   
  
 morphine 100 mg/5 mL (20 mg/mL) concentrated solution Commonly known as:  Zo  Your last dose was: Your next dose is:    
   
   
 Dose:  5 mg Take 0.25 mL by mouth every two (2) hours as needed. Max Daily Amount: 60 mg.  
 Quantity:  30 mL Refills:  0  
     
   
   
   
  
 promethazine 25 mg suppository Commonly known as:  PHENERGAN Replaces:  promethazine 25 mg tablet Your last dose was: Your next dose is:    
   
   
 Dose:  25 mg Insert 1 Suppository into rectum every six (6) hours as needed for up to 7 days. Quantity:  30 Suppository Refills:  0  
     
   
   
   
  
 scopolamine 1.5 mg (1 mg over 3 days) Pt3d Commonly known as:  TRANSDERM-SCOP Your last dose was: Your next dose is:    
   
   
 Dose:  1.5 mg  
1 Patch by TransDERmal route every seventy-two (72) hours. Quantity:  10 Patch Refills:  0 CONTINUE these medications which have NOT CHANGED Dose & Instructions Dispensing Information Comments Morning Noon Evening Bedtime ZOLOFT 100 mg tablet Generic drug:  sertraline Your last dose was:     
   
Your next dose is:    
   
   
 Dose:  100 mg  
 Take 100 mg by mouth two (2) times a day. Indications: ANXIETY WITH DEPRESSION Refills:  0 STOP taking these medications AMBIEN 10 mg tablet Generic drug:  zolpidem  
   
  
 aspirin delayed-release 325 mg tablet  
   
  
 atorvastatin 80 mg tablet Commonly known as:  LIPITOR  
   
  
 hydroCHLOROthiazide 12.5 mg capsule Commonly known as:  MICROZIDE  
   
  
 levETIRAcetam in NaCl (iso-os) 1,000 mg/100 mL Pgbk IVPB premix Commonly known as:  KEPPRA  
   
  
 mannitol 20 % infusion Commonly known as:  OSMITROL  
   
  
 promethazine 25 mg tablet Commonly known as:  PHENERGAN Replaced by:  promethazine 25 mg suppository XANAX 1 mg tablet Generic drug:  ALPRAZolam  
   
  
  
  
Where to Get Your Medications Information on where to get these meds will be given to you by the nurse or doctor. ! Ask your nurse or doctor about these medications  
  haloperidol 2 mg/mL oral concentrate LORazepam 2 mg/mL concentrated solution  
 morphine 100 mg/5 mL (20 mg/mL) concentrated solution  
 promethazine 25 mg suppository  
 scopolamine 1.5 mg (1 mg over 3 days) Pt3d

## 2017-04-22 NOTE — TELEPHONE ENCOUNTER
50year-old female with aphasia and right hemiparesis. Dr. Nahum Gomez called me about this patient at [facility]. See his note for details. Briefly, the patient was last known at baseline at 11pm last night. This morning she had aphasia and right hemiparesis. She was brought to the [facility] ED.  NIHSS 18. -130/70-80. Personal review of her neuroimaging studies shows CT acute stroke in the LACA anterior frontal branch and LMCA superior division territories. There was preservation in part of the LMCA inferior divison and LACA pericallosal and callosomarginal regions. CTA LICA tapers to no filling in the proximal cervical to the terminus into the LA1 and LM1. The ACoA and distal LACA fill. The LMCA inferior division has some patchy preservation. This could be an LICA dissection with tapering morphology but often it is just proximal arterial collapse after an embolism to the terminus into the LMCA and LACA. She may have more territory at risk especially language comprehension and primary sensorimotor function. An emergent MRI wakeup stroke protocol may help determine if there is hypoperfused brain that might be saved. It would be very high risk to revascularize these potentially ischemic but not infarcted territories as the more anterior areas of established infarct would also likely be revascularized. This would result in significant reperfusion injury with increasing mass effect from cerebral edema and/or hemorrhagic transformation. Usually this mass effect is refractory to medical therapy and requires decompressive hemicraniectomy and duraplasty for relief. Nonetheless, with the size of the current infarct and possibility of additional regions of ischemia turning to infarct she may require a hemicraniectomy anyway to survive. Transferring the patient to the [facility] NSICU for neurocritical care is reasonable with or without endovascular revasculariztion and/or hemicraniectomy.    Discussed with Dr. Gwen Duff and also Dr. Gailna Joya who had contacted me.

## 2017-04-22 NOTE — IP AVS SNAPSHOT
[address] [place] [address] 
[phone] Patient: Coy Acosta MRN: [id number] :1970 You are allergic to the following Allergen Reactions Lisinopril Cough Penicillins Rash Recent Documentation Height Weight BMI OB Status Smoking Status 1.702 m 60.3 kg 20.83 kg/m2 Premenopausal Current Every Day Smoker Emergency Contacts Name Discharge Info Relation Home Work Mobile Redd De La Rosa  Mother [14] [phone] Redd De La Rosa  Parent [1] [phone] About your hospitalization You were admitted on:  2017 You last received care in the:  [address] [facility] You were discharged on:  2017 Unit phone number:  [phone] Why you were hospitalized Your primary diagnosis was:  Cerebral Infarction Due To Occlusion Of Left Internal Carotid Artery (Hcc) Your diagnoses also included:  Acute Ischemic Left Mca Stroke (Hcc), Acute Left Sincere Ischemic Stroke (Hcc), Essential Hypertension, Hypercholesterolemia, Gerd (Gastroesophageal Reflux Disease), Smoking, Cerebral Edema (Hcc), Brain Compression (Hcc), Feeding Difficulties, Debility Providers Seen During Your Hospitalizations Provider Role Specialty Primary office phone Melo Fonseca DO Attending Provider Internal Medicine [phone] Nilson Montiel MD Attending Provider Internal Medicine [phone] Jose Martin Cortés MD Attending Provider Internal Medicine [phone] Your Primary Care Physician (PCP) Primary Care Physician Office Phone Office Fax Uriel Basilio [phone] Follow-up Information Follow up With Details Comments Contact Info Aneta Martinez NP   [id number] 
[phone] Current Discharge Medication List  
  
START taking these medications Dose & Instructions Dispensing Information Comments Morning Noon Evening Bedtime  
 haloperidol 2 mg/mL oral concentrate Commonly known as:  HALDOL Your last dose was: Your next dose is:    
   
   
 Dose:  2 mg Take 1 mL by mouth every four (4) hours as needed. Quantity:  30 mL Refills:  0 LORazepam 2 mg/mL concentrated solution Commonly known as:  INTENSOL Your last dose was: Your next dose is:    
   
   
 Dose:  1 mg Take 0.5 mL by mouth every four (4) hours as needed. Max Daily Amount: 6 mg. Quantity:  30 mL Refills:  0  
     
   
   
   
  
 morphine 100 mg/5 mL (20 mg/mL) concentrated solution Commonly known as:  Agnieszka Miller Your last dose was: Your next dose is:    
   
   
 Dose:  5 mg Take 0.25 mL by mouth every two (2) hours as needed. Max Daily Amount: 60 mg.  
 Quantity:  30 mL Refills:  0  
     
   
   
   
  
 promethazine 25 mg suppository Commonly known as:  PHENERGAN Replaces:  promethazine 25 mg tablet Your last dose was: Your next dose is:    
   
   
 Dose:  25 mg Insert 1 Suppository into rectum every six (6) hours as needed for up to 7 days. Quantity:  30 Suppository Refills:  0  
     
   
   
   
  
 scopolamine 1.5 mg (1 mg over 3 days) Pt3d Commonly known as:  TRANSDERM-SCOP Your last dose was: Your next dose is:    
   
   
 Dose:  1.5 mg  
1 Patch by TransDERmal route every seventy-two (72) hours. Quantity:  10 Patch Refills:  0 CONTINUE these medications which have NOT CHANGED Dose & Instructions Dispensing Information Comments Morning Noon Evening Bedtime ZOLOFT 100 mg tablet Generic drug:  sertraline Your last dose was: Your next dose is:    
   
   
 Dose:  100 mg Take 100 mg by mouth two (2) times a day. Indications: ANXIETY WITH DEPRESSION Refills:  0 STOP taking these medications AMBIEN 10 mg tablet Generic drug:  zolpidem  
   
  
 aspirin delayed-release 325 mg tablet  
   
  
 atorvastatin 80 mg tablet Commonly known as:  LIPITOR  
   
  
 hydroCHLOROthiazide 12.5 mg capsule Commonly known as:  MICROZIDE  
   
  
 levETIRAcetam in NaCl (iso-os) 1,000 mg/100 mL Pgbk IVPB premix Commonly known as:  KEPPRA  
   
  
 mannitol 20 % infusion Commonly known as:  OSMITROL  
   
  
 promethazine 25 mg tablet Commonly known as:  PHENERGAN Replaced by:  promethazine 25 mg suppository XANAX 1 mg tablet Generic drug:  ALPRAZolam  
   
  
  
  
Where to Get Your Medications Information on where to get these meds will be given to you by the nurse or doctor. ! Ask your nurse or doctor about these medications  
  haloperidol 2 mg/mL oral concentrate LORazepam 2 mg/mL concentrated solution  
 morphine 100 mg/5 mL (20 mg/mL) concentrated solution  
 promethazine 25 mg suppository  
 scopolamine 1.5 mg (1 mg over 3 days) Pt3d Discharge Instructions DISCHARGE SUMMARY from Nurse The following personal items are in your possession at time of discharge: 
 
Dental Appliances: None Visual Aid: None Home Medications: Sent home Jewelry: None Clothing: None Other Valuables: None PATIENT INSTRUCTIONS: 
 
 
F-face looks uneven A-arms unable to move or move unevenly S-speech slurred or non-existent T-time-call 911 as soon as signs and symptoms begin-DO NOT go Back to bed or wait to see if you get better-TIME IS BRAIN. Warning Signs of HEART ATTACK Call 911 if you have these symptoms: 
? Chest discomfort.  Most heart attacks involve discomfort in the center of the chest that lasts more than a few minutes, or that goes away and comes back. It can feel like uncomfortable pressure, squeezing, fullness, or pain. ? Discomfort in other areas of the upper body. Symptoms can include pain or discomfort in one or both arms, the back, neck, jaw, or stomach. ? Shortness of breath with or without chest discomfort. ? Other signs may include breaking out in a cold sweat, nausea, or lightheadedness. Don't wait more than five minutes to call [address]! Fast action can save your life. Calling 911 is almost always the fastest way to get lifesaving treatment. Emergency Medical Services staff can begin treatment when they arrive  up to an hour sooner than if someone gets to the hospital by car. The discharge information has been reviewed with the patient. The patient verbalized understanding. Discharge medications reviewed with the patient and appropriate educational materials and side effects teaching were provided. Transient Ischemic Attack: Care Instructions Your Care Instructions A transient ischemic attack (TIA) is when blood flow to a part of your brain is blocked for a short time. A TIA is like a stroke but usually lasts only a few minutes. A TIA does not cause lasting brain damage. Any vision problems, slurred speech, or other symptoms usually go away in 10 to 20 minutes. But they may last for up to 24 hours. TIAs are often warning signs of a stroke. Some people who have a TIA may have a stroke in the future. A stroke can cause symptoms like those of a TIA. But a stroke causes lasting damage to your brain. You can take steps to help prevent a stroke. One thing you can do is get early treatment. If you have other new symptoms, or if your symptoms do not get better, go back to the emergency room or call your doctor right away. Getting treatment right away may prevent long-term brain damage caused by a stroke. The doctor has checked you carefully, but problems can develop later.  If you notice any problems or new symptoms, get medical treatment right away. Follow-up care is a key part of your treatment and safety. Be sure to make and go to all appointments, and call your doctor if you are having problems. It's also a good idea to know your test results and keep a list of the medicines you take. How can you care for yourself at home? Medicines · Be safe with medicines. Take your medicines exactly as prescribed. Call your doctor if you think you are having a problem with your medicine. · If you take a blood thinner, such as aspirin, be sure you get instructions about how to take your medicine safely. Blood thinners can cause serious bleeding problems. · Call your doctor if you are not able to take your medicines for any reason. · Do not take any over-the-counter medicines or herbal products without talking to your doctor first. 
· If you take birth control pills or hormone therapy, talk to your doctor. Ask if these treatments are right for you. Lifestyle changes · Do not smoke. If you need help quitting, talk to your doctor about stop-smoking programs and medicines. · Be active. If your doctor recommends it, get more exercise. Walking is a good choice. Bit by bit, increase the amount you walk every day. Try for at least 30 minutes on most days of the week. You also may want to swim, bike, or do other activities. · Eat heart-healthy foods. These include fruits, vegetables, high-fiber foods, fish, and foods that are low in sodium, saturated fat, and trans fat. · Stay at a healthy weight. Lose weight if you need to. · Limit alcohol to 2 drinks a day for men and 1 drink a day for women. Staying healthy · Manage other health problems such as diabetes, high blood pressure, and high cholesterol. · Get the flu vaccine every year. When should you call for help? Call 911 anytime you think you may need emergency care. For example, call if: · You have new or worse symptoms of a stroke. These may include: 
¨ Sudden numbness, tingling, weakness, or loss of movement in your face, arm, or leg, especially on only one side of your body. ¨ Sudden vision changes. ¨ Sudden trouble speaking. ¨ Sudden confusion or trouble understanding simple statements. ¨ Sudden problems with walking or balance. ¨ A sudden, severe headache that is different from past headaches. Call 911 even if these symptoms go away in a few minutes. · You feel like you are having another TIA. Watch closely for changes in your health, and be sure to contact your doctor if you have any problems. Where can you learn more? Go to http://[employer] ([employer]: [place])britton.info/. Enter [phone] in the search box to learn more about \"Transient Ischemic Attack: Care Instructions. \" Current as of: June 4, 2016 Content Version: 11.2 © 0867-4520 [employer]. Care instructions adapted under license by [employer] (which disclaims liability or warranty for this information). If you have questions about a medical condition or this instruction, always ask your healthcare professional. [place] [address] any warranty or liability for your use of this information. Learning About a Hemorrhagic Stroke What is a hemorrhagic stroke? When you have a hemorrhagic (say \"txk-fnj-DC-jick\") stroke, it means that a blood vessel in the brain has burst open or has started to leak. When the blood spills into the space inside and around the brain, it damages nearby nerve cells. This is different from an ischemic (say \"bmv-JPH-mfpb\") stroke, which happens when a blood clot blocks a blood vessel in the brain. The brain damage from a stroke starts within minutes. Quick treatment can help limit damage to the brain and make recovery more likely.  
People who have had a stroke may have a hard time talking, understanding things, and making decisions. They may have to relearn daily activities, such as how to eat, bathe, and dress. How well someone recovers from a stroke depends on how quickly the person gets to the hospital, where in the brain the stroke happened, and how severe it was. Stroke rehabilitation, which includes training and therapy, also helps people recover. What are the symptoms? If you have any of these symptoms, call 911 or other emergency services right away. · You have symptoms of a stroke. These may include: 
¨ Sudden numbness, tingling, weakness, or loss of movement in your face, arm, or leg, especially on only one side of your body. ¨ Sudden vision changes. ¨ Sudden trouble speaking. ¨ Sudden confusion or trouble understanding simple statements. ¨ Sudden problems with walking or balance. ¨ A sudden, severe headache that is different from past headaches. See your doctor if you have symptoms that seem like a stroke, even if they go away quickly. You may have had a transient ischemic attack (TIA), sometimes called a mini-stroke. A TIA is a warning that a stroke may happen soon. Getting early treatment for a TIA can help prevent a stroke. What causes a hemorrhagic stroke? A hemorrhagic stroke happens to blood vessels that have been weakened. The most common causes of weakened blood vessels in the brain are: · A brain aneurysm. This is a bulging, weak part of a blood vessel. It can put pressure on nerves, or it can bleed or break open (rupture). · A brain AVM. This is an abnormal knot of weak blood vessels that some people are born with. · Head injury. · Chronic uncontrolled high blood pressure. Blood pressure that is too high over the long term increases your risk for stroke. · Very high blood pressure. Very high blood pressure that comes on suddenly is dangerous. It is a medical emergency.  
Anticoagulant and antiplatelet medicines can also cause bleeding in the brain. These medicines, also called blood thinners, increase the time it takes for a blood clot to form. How is hemorrhagic stroke treated? Emergency treatment is done to stop the bleeding and prevent damage to the brain. · You may need surgery to repair an aneurysm or to remove the blood that has built up inside the brain. · You may be given medicine to stop the bleeding. · You will be closely watched for signs of increased pressure on the brain. These signs include restlessness, confusion, trouble following commands, and headache. · You may take medicine to manage high blood pressure. Ask your doctor if a stroke rehab program is right for you. Rehab increases your chances of getting back some of the abilities you lost. 
Follow-up care is a key part of your treatment and safety. Be sure to make and go to all appointments, and call your doctor if you are having problems. It's also a good idea to know your test results and keep a list of the medicines you take. How can you prevent another stroke? · Work with your doctor to treat health problems, such as high blood pressure, that raise your chances of having another stroke. · Be safe with medicines. Take your medicine exactly as prescribed. Call your doctor if you think you are having a problem with your medicine. · Have a healthy lifestyle. ¨ Do not smoke or allow others to smoke around you. If you need help quitting, talk to your doctor about stop-smoking programs and medicines. These can increase your chances of quitting for good. Smoking makes a stroke more likely. ¨ Limit alcohol to 2 drinks a day for men and 1 drink a day for women. ¨ Be active. Ask your doctor what type and level of activity is safe for you. ¨ Eat heart-healthy foods, like fruits, vegetables, and high-fiber foods. ¨ Stay at a healthy weight. Lose weight if you need to. Where can you learn more? Go to http://diego-britton.info/. Enter R416 in the search box to learn more about \"Learning About a Hemorrhagic Stroke. \" Current as of: 2016 Content Version: 11.2 © 7332-6637 [address]. Care instructions adapted under license by [employer] (which disclaims liability or warranty for this information). If you have questions about a medical condition or this instruction, always ask your healthcare professional. [address] any warranty or liability for your use of this information. [employer] Activation Thank you for requesting access to [email or web address]. Please follow the instructions below to securely access and download your online medical record. [email or web address] allows you to send messages to your doctor, view your test results, renew your prescriptions, schedule appointments, and more. How Do I Sign Up? 1. In your internet browser, go to [email or web address] 
2. Click on the First Time User? Click Here link in the Sign In box. You will be redirect to the New Member Sign Up page. 3. Enter your [email or web address] Access Code exactly as it appears below. You will not need to use this code after youve completed the sign-up process. If you do not sign up before the expiration date, you must request a new code. [email or web address] Access Code: Activation code not generated Current [email or web address] Status: Active (This is the date your [email or web address] access code will ) 4. Enter the last four digits of your Social Security Number (xxxx) and Date of Birth (mm/dd/yyyy) as indicated and click Submit. You will be taken to the next sign-up page. 5. Create a [email or web address] ID. This will be your [email or web address] login ID and cannot be changed, so think of one that is secure and easy to remember. 6. Create a [email or web address] password. You can change your password at any time. 7. Enter your Password Reset Question and Answer. This can be used at a later time if you forget your password. 8. Enter your e-mail address. You will receive e-mail notification when new information is available in [address] 9. Click Sign Up. You can now view and download portions of your medical record. 10. Click the Download Summary menu link to download a portable copy of your medical information. Additional Information If you have questions, please visit the Frequently Asked Questions section of the [facility] website at [email or web address] Remember, MyChart is NOT to be used for urgent needs. For medical emergencies, dial 911. Patient armband removed and shredded Discharge Orders None [facility] Announcement We are excited to announce that we are making your provider's discharge notes available to you in [facility]. You will see these notes when they are completed and signed by the physician that discharged you from your recent hospital stay. If you have any questions or concerns about any information you see in [facility], please call the Health Information Department where you were seen or reach out to your Primary Care Provider for more information about your plan of care. Introducing [facility]! Dear Darya Diver: Thank you for requesting a [facility] account. Our records indicate that you already have an active [facility] account. You can access your account anytime at [email or web address] [facility]/[employer] Did you know that you can access your hospital and ER discharge instructions at any time in [facility]? You can also review all of your test results from your hospital stay or ER visit. Additional Information If you have questions, please visit the Frequently Asked Questions section of the [facility] website at [email or web address] Remember, MyChart is NOT to be used for urgent needs. For medical emergencies, dial 911. Now available from your iPhone and Android! General Information Please provide this summary of care documentation to your next provider. Patient Signature:  ____________________________________________________________ Date:  ____________________________________________________________  
  
Tinnie Has Provider Signature:  ____________________________________________________________ Date:  ____________________________________________________________

## 2017-04-23 PROBLEM — E78.00 HYPERCHOLESTEROLEMIA: Status: ACTIVE | Noted: 2017-01-01

## 2017-04-23 PROBLEM — G93.6 CEREBRAL EDEMA (HCC): Status: ACTIVE | Noted: 2017-01-01

## 2017-04-23 PROBLEM — F17.200 SMOKING: Status: ACTIVE | Noted: 2017-01-01

## 2017-04-23 PROBLEM — G93.5 BRAIN COMPRESSION (HCC): Status: ACTIVE | Noted: 2017-01-01

## 2017-04-23 PROBLEM — K21.9 GERD (GASTROESOPHAGEAL REFLUX DISEASE): Status: ACTIVE | Noted: 2017-01-01

## 2017-04-23 NOTE — ACP (ADVANCE CARE PLANNING)
Patient has is unable to designate anyone to participate in his/her discharge plan and to receive any needed information at this time. Mother THOM.     Name:   Aakash Mc  Address:  Phone number:  [phone] / [phone]

## 2017-04-23 NOTE — ROUTINE PROCESS
Left PICC inserted utilizing 3CG for SVC tip verification. Released for use, ARISTEO Esposito notified.

## 2017-04-23 NOTE — CONSULTS
Brief Note  51 yo with occluded left ICA and assoc infarct. Dysphasic with dense right hemiparesis.   - spoke with mother and sister  - on board for possible hemicraniectomy

## 2017-04-23 NOTE — PROGRESS NOTES
Patient arrived on the unit from 2601 via bed. Patient is alert, resting in bed, in stable condition. Patient does not appear to be in any pain or discomfort.

## 2017-04-23 NOTE — PROGRESS NOTES
Orders canceled and patient moved to comfort measures acknowledged orders than you for asking us to work with patient .

## 2017-04-23 NOTE — PROGRESS NOTES
Neurovascular Progress Note      Patient: Delmar Page MRN: [id number]  SSN: [id number]    YOB: 1970  Age: 50 y.o. Sex: female      Events  Patient arrived from [facility] to [facility]. MRI performed about 23 hours from last seen at baseline. The study shows the LACA and LMCA acute infarct now includes the posterior portion of the respective vascular territories as well as the previous anterior portion. The LMCA temporal and inferior parietal territories are preserved likely from LPCA collaterals. The [address] territory may also have infarct from origin occlusion. There are no other vascular territories of acute infarct to suggest an embolic shower. There is no hemorrhagic transformation at present. Cerebral edema with mass effect narrowing the sylvian fissure and cortical sulci is developing with about 6mm of left to right midline shift at the level of the foramina of [place].       Vital Signs  Patient Vitals for the past 24 hrs:   Temp Pulse Resp BP SpO2   04/23/17 0200 - 77 17 112/72 95 %   04/23/17 0100 - 80 17 120/73 97 %   04/23/17 0001 - 80 15 - 98 %   04/23/17 0000 99 °F (37.2 °C) 81 20 117/72 95 %   04/22/17 2300 - 80 17 111/74 96 %   04/22/17 2223 - 80 18 - 96 %   04/22/17 2221 - - - 122/76 -   04/22/17 2101 - 89 20 128/77 96 %   04/22/17 2100 - 88 19 - 96 %   04/22/17 2032 - 83 18 115/71 95 %   04/22/17 2008 - 79 17 - 96 %       NIHSS Flow Sheet  Total: 27 (04/23/17 0200)     Vent         Intake and Output    Intake/Output Summary (Last 24 hours) at 04/23/17 0351  Last data filed at 04/23/17 0200   Gross per 24 hour   Intake           618.57 ml   Output              600 ml   Net            18.57 ml       Data    Recent Results (from the past 24 hour(s))   CBC W/O DIFF    Collection Time: 04/22/17 10:43 AM   Result Value Ref Range    WBC 19.6 (H) 4.0 - 11.0 1000/mm3    RBC 3.75 3.60 - 5.20 M/uL    HGB 14.3 13.0 - 17.2 gm/dl    HCT 40.6 37.0 - 50.0 %    .3 (H) 80.0 - 98.0 fL    MCH 38.1 (H) 25.4 - 34.6 pg    MCHC 35.2 30.0 - 36.0 gm/dl    PLATELET 427 634 - 286 1000/mm3    MPV 9.6 6.0 - 10.0 fL    RDW-SD 53.1 (H) 36.4 - 46.3     POC CHEM8    Collection Time: 04/22/17 10:44 AM   Result Value Ref Range    Sodium 134 (L) 136 - 145 mEq/L    Potassium 6.6 (HH) 3.5 - 4.9 mEq/L    Chloride 98 98 - 107 mEq/L    CO2, TOTAL 28 21 - 32 mmol/L    Glucose 97 74 - 106 mg/dL    BUN 8 7 - 25 mg/dl    Creatinine 0.7 0.6 - 1.3 mg/dl    HCT 45 38 - 45 %    HGB 15.3 13.0 - 17.2 gm/dl    CALCIUM,IONIZED 3.90 (L) 4.40 - 8.66 mg/dL   METABOLIC PANEL, BASIC    Collection Time: 04/22/17 11:44 AM   Result Value Ref Range    Sodium 138 136 - 145 mEq/L    Potassium 3.4 (L) 3.5 - 5.1 mEq/L    Chloride 101 98 - 107 mEq/L    CO2 28 21 - 32 mEq/L    Glucose 99 74 - 106 mg/dl    BUN 7 7 - 25 mg/dl    Creatinine 0.8 0.6 - 1.3 mg/dl    GFR est AA >60.0      GFR est non-AA >60      Calcium 8.7 8.5 - 10.1 mg/dl   POC CHEM8    Collection Time: 04/22/17 11:44 AM   Result Value Ref Range    Sodium 139 136 - 145 mEq/L    Potassium 3.4 (L) 3.5 - 4.9 mEq/L    Chloride 98 98 - 107 mEq/L    CO2, TOTAL 26 21 - 32 mmol/L    Glucose 92 74 - 106 mg/dL    BUN 6 (L) 7 - 25 mg/dl    Creatinine 0.7 0.6 - 1.3 mg/dl    HCT 43 38 - 45 %    HGB 14.6 13.0 - 17.2 gm/dl    CALCIUM,IONIZED 4.50 4.40 - 5.40 mg/dL   POC URINE MACROSCOPIC    Collection Time: 04/22/17 11:44 AM   Result Value Ref Range    Glucose Negative NEGATIVE,Negative mg/dl    Bilirubin Negative NEGATIVE,Negative      Ketone Negative NEGATIVE,Negative mg/dl    Specific gravity 1.015 1.005 - 1.030      Blood Trace-lysed (A) NEGATIVE,Negative      pH (UA) 7.5 5 - 9      Protein Negative NEGATIVE,Negative mg/dl    Urobilinogen 0.2 0.0 - 1.0 EU/dl    Nitrites Negative NEGATIVE,Negative      Leukocyte Esterase Negative NEGATIVE,Negative      Color Yellow      Appearance Clear     LACTIC ACID, PLASMA    Collection Time: 04/22/17 11:58 AM   Result Value Ref Range    Lactic Acid 1.5 0.4 - 2.0 mmol/L GLUCOSE, POC    Collection Time: 04/22/17  1:43 PM   Result Value Ref Range    Glucose (POC) 92 65 - 105 mg/dL   DRUG SCREEN, URINE    Collection Time: 04/22/17  1:50 PM   Result Value Ref Range    Amphetamine NEGATIVE NEGATIVE      Barbiturates NEGATIVE NEGATIVE      Benzodiazepines POSITIVE (A) NEGATIVE      Cocaine NEGATIVE NEGATIVE      Marijuana NEGATIVE NEGATIVE      Methadone NEGATIVE NEGATIVE      Opiates NEGATIVE NEGATIVE      Phencyclidine NEGATIVE NEGATIVE     LIPID PANEL    Collection Time: 04/22/17 10:45 PM   Result Value Ref Range    LIPID PROFILE          Cholesterol, total 198 <200 MG/DL    Triglyceride 128 <150 MG/DL    HDL Cholesterol 41 40 - 60 MG/DL    LDL, calculated 131.4 (H) 0 - 100 MG/DL    VLDL, calculated 25.6 MG/DL    CHOL/HDL Ratio 4.8 0 - 5.0     HEMOGLOBIN A1C WITH EAG    Collection Time: 04/22/17 10:45 PM   Result Value Ref Range    Hemoglobin A1c 5.3 4.2 - 5.6 %    Est. average glucose 105 mg/dL   SED RATE (ESR)    Collection Time: 04/22/17 10:45 PM   Result Value Ref Range    Sed rate, automated 10 0 - 20 mm/hr   TSH 3RD GENERATION    Collection Time: 04/22/17 10:45 PM   Result Value Ref Range    TSH 0.66 0.36 - 3.74 uIU/mL   T3, FREE    Collection Time: 04/22/17 10:45 PM   Result Value Ref Range    Triiodothyronine (T3), free 1.8 (L) 2.3 - 4.2 PG/ML   T4, FREE    Collection Time: 04/22/17 10:45 PM   Result Value Ref Range    T4, Free 0.8 0.7 - 1.5 NG/DL          Assessment/Plan  50year-old female with LICA occlusion causing a large left cerebral hemisphere ischemic stroke. There is some preservation of the left temporal lobe and left inferior parietal lobe. Perhaps that is why she able to follow commands versus some language function in the other hemisphere. There is no role for acute neuroendovascular revascularization. Mass effect is starting to increase. Mannitol was initiated at [facility].   Switching to hypertonic saline protocol for less rebound effects may be helpful if she is to get through this with medical management alone. However, it is unlikely that medical management will be sufficient for the expected mass effect and a decompressive left hemicraniectomy with duraplasty will likely be the only way to save her life and chance for some neurologic recovery possibly including some language comprehension. NSICU AIS order set. Do not allow BP to go to high to hasten cerebral edema but not too low to lose collateral perfusion. Consider CVL or PICC for hypertonic protocol which can be initiated through at PIV. Total fluid goal restriction 60ml/h. Pre op labs. No antithrombotic agents. Allocate platelets (2 units) to be given on the way to the hemicraniectomy (if she needs it) to reverse the effects of aspirin which was already given. CT head w/o contrast 8 hours after MRI. Consult general neurology and CCM for continued management as there no acute neuroendovascular needs. . Please reconsult if any neuroendovascular needs arise. Discussed with Dr. Chon Long and Dr. Jeremiah Scott.       Mady Banerjee MD

## 2017-04-23 NOTE — PROGRESS NOTES
Neurology Consult Note  Admit Date: 4/22/2017  Length of Stay: 1  Primary Care: Shola Mosley NP   Principle Problem: Cerebral infarction due to occlusion of left internal carotid artery [facility])     Assessment/Plan    Massive Left ICA Stroke  Family discussion with Dr. Jeri Fonseca as detailed below. They agree she would not want to live with these severe deficits and do not wish to pursue hemicraniectomy. Dr. Jeri Fonseca said he will call the floor team and palliative care to get involved. Hypertonic saline to be stopped and the plan is for the patient to move to the floor.     :       History: This is a 50year old female transferred for the possibility of neurovascular intervention who suffered a devastating total Left ICA stroke. By the time of arrival the infarct was complete and Dr Jeri Fonseca felt there was no interventional role available. I saw the patient with Dr. Jeri Fonseca with her , mother and daughter at bedside. Dr. Jeri Fonseca and I spoke with the , mother and daughter who all agreed that the patient would not want to live with these devastating deficits. In light of this they wish not to pursue hemicraniectomy and would want the patient to be DNR. Results reviewed:   MRI Brain: left ICA stroke  Discussed with: Dr. Jeri Fonseca. Allergies:    Allergies   Allergen Reactions    Lisinopril Cough    Penicillins Rash      Review of Systems: Unable to obtain due to aphasia      PMH:   Past Medical History:   Diagnosis Date    Alcoholism ([facility] [address]     Anemia NEC     Anxiety     Arthralgia     Cerebral infarction due to occlusion of left internal carotid artery (HCC) 4/22/2017    Constipation     Depression     Fatigue     for more than 11 years    Fibromyalgia     GERD (gastroesophageal reflux disease)     Hypercholesterolemia     Hypertension     Leukocytosis     Migraines     Nodule of left lung     on CT scan        Problem List: Principal Problem:    LICA occlusion with ischemia and infarct (4/22/2017)    Active Problems:    Essential hypertension (4/10/2017)      LACA occlusion with ischemia and infarct (4/22/2017)      LMCA occlusion with ischemia and infarct (4/22/2017)      Hypercholesterolemia (4/23/2017)      GERD (gastroesophageal reflux disease) (4/23/2017)      Smoking (4/23/2017)      Cerebral edema ([address] (4/23/2017)      Brain compression ([address] (4/23/2017)        FH:   Family History   Problem Relation Age of Onset    COPD Father     High Cholesterol Mother     Other Mother      Reflux    Cancer Other      colon        SH:   Social History     Social History    Marital status: LEGALLY      Spouse name: N/A    Number of children: N/A    Years of education: N/A     Social History Main Topics    Smoking status: Current Every Day Smoker     Packs/day: 0.50    Smokeless tobacco: Current User    Alcohol use No      Comment: Currently on cessation.  Drug use: No    Sexual activity: No     Other Topics Concern    None     Social History Narrative          Vital Signs:   Visit Vitals    /76    Pulse 83    Temp 100.1 °F (37.8 °C)    Resp 18    Ht 5' 7\" (1.702 m)    Wt 60.6 kg (133 lb 9.6 oz)    SpO2 98%    BMI 20.92 kg/m2      Neurological examination:     Opens eyes to verbal stim. PERRL 4mm--> 2mm VOR's intact. Blink to VT in left field. Right hemianopsia to VT. Right flaccid hemiparesis. Withdrawals left arm and leg to noxious stim. Globally aphasic.            Medications:      [x] REVIEWED  Current Facility-Administered Medications   Medication Dose Route Frequency    hypertonic saline 3 % (NEURO SCIENCE USE ONLY) infusion  0.25-1 mL/kg/hr IntraVENous TITRATE    dextrose 5% - 0.9% NaCl with KCl 40 mEq/L infusion   IntraVENous CONTINUOUS    bacitracin 500 unit/gram packet 1 Packet  1 Packet Topical PRN    sodium chloride (NS) flush 10-30 mL  10-30 mL InterCATHeter PRN    sodium chloride (NS) flush 10 mL  10 mL InterCATHeter Q24H    sodium chloride (NS) flush 10 mL  10 mL InterCATHeter PRN    sodium chloride (NS) flush 10-40 mL  10-40 mL InterCATHeter Q8H    sodium chloride (NS) flush 20 mL  20 mL InterCATHeter Q24H    dextrose 5% and 0.9% NaCl infusion  0.75 mL/kg/hr IntraVENous CONTINUOUS    ondansetron (ZOFRAN) injection 1 mg  1 mg IntraVENous Q6H PRN    atorvastatin (LIPITOR) tablet 80 mg  80 mg Oral QHS    acetaminophen (TYLENOL) tablet 650 mg  650 mg Oral Q4H PRN    acetaminophen (TYLENOL) suppository 650 mg  650 mg Rectal Q4H PRN    senna-docusate (PERICOLACE) 8.6-50 mg per tablet 2 Tab  2 Tab Oral QHS    pantoprazole (PROTONIX) 40 mg in sodium chloride 0.9 % 10 mL injection  40 mg IntraVENous Q12H    albuterol (PROVENTIL VENTOLIN) nebulizer solution 2.5 mg  2.5 mg Nebulization Y2Z PRN    folic acid (FOLVITE) 1 mg, thiamine (B-1) 100 mg in 0.9% sodium chloride 50 mL ivpb   IntraVENous DAILY      Data:      [x] REVIEWED  Recent Results (from the past 24 hour(s))   LIPID PANEL    Collection Time: 04/22/17 10:45 PM   Result Value Ref Range    LIPID PROFILE          Cholesterol, total 198 <200 MG/DL    Triglyceride 128 <150 MG/DL    HDL Cholesterol 41 40 - 60 MG/DL    LDL, calculated 131.4 (H) 0 - 100 MG/DL    VLDL, calculated 25.6 MG/DL    CHOL/HDL Ratio 4.8 0 - 5.0     HEMOGLOBIN A1C WITH EAG    Collection Time: 04/22/17 10:45 PM   Result Value Ref Range    Hemoglobin A1c 5.3 4.2 - 5.6 %    Est. average glucose 105 mg/dL   SED RATE (ESR)    Collection Time: 04/22/17 10:45 PM   Result Value Ref Range    Sed rate, automated 10 0 - 20 mm/hr   TSH 3RD GENERATION    Collection Time: 04/22/17 10:45 PM   Result Value Ref Range    TSH 0.66 0.36 - 3.74 uIU/mL   T3, FREE    Collection Time: 04/22/17 10:45 PM   Result Value Ref Range    Triiodothyronine (T3), free 1.8 (L) 2.3 - 4.2 PG/ML   T4, FREE    Collection Time: 04/22/17 10:45 PM   Result Value Ref Range    T4, Free 0.8 0.7 - 1.5 NG/DL   GLUCOSE, POC    Collection Time: 04/23/17  3:43 AM Result Value Ref Range    Glucose (POC) 120 (H) 70 - 110 mg/dL   PROTHROMBIN TIME + INR    Collection Time: 04/23/17  4:30 AM   Result Value Ref Range    Prothrombin time 14.6 11.5 - 15.2 sec    INR 1.2 0.8 - 1.2     PTT    Collection Time: 04/23/17  4:30 AM   Result Value Ref Range    aPTT 42.7 (H) 23.0 - 36.4 SEC   TYPE & SCREEN    Collection Time: 04/23/17  4:30 AM   Result Value Ref Range    Crossmatch Expiration 04/26/2017     ABO/Rh(D) A POSITIVE     Antibody screen NEG    CBC WITH AUTOMATED DIFF    Collection Time: 04/23/17  4:30 AM   Result Value Ref Range    WBC 13.3 (H) 4.6 - 13.2 K/uL    RBC 3.36 (L) 4.20 - 5.30 M/uL    HGB 12.8 12.0 - 16.0 g/dL    HCT 36.8 35.0 - 45.0 %    .5 (H) 74.0 - 97.0 FL    MCH 38.1 (H) 24.0 - 34.0 PG    MCHC 34.8 31.0 - 37.0 g/dL    RDW 13.7 11.6 - 14.5 %    PLATELET 190 633 - 029 K/uL    MPV 9.6 9.2 - 11.8 FL    NEUTROPHILS 83 (H) 40 - 73 %    LYMPHOCYTES 12 (L) 21 - 52 %    MONOCYTES 5 3 - 10 %    EOSINOPHILS 0 0 - 5 %    BASOPHILS 0 0 - 2 %    ABS. NEUTROPHILS 10.9 (H) 1.8 - 8.0 K/UL    ABS. LYMPHOCYTES 1.6 0.9 - 3.6 K/UL    ABS. MONOCYTES 0.7 0.05 - 1.2 K/UL    ABS. EOSINOPHILS 0.0 0.0 - 0.4 K/UL    ABS. BASOPHILS 0.0 0.0 - 0.06 K/UL    DF AUTOMATED     METABOLIC PANEL, COMPREHENSIVE    Collection Time: 04/23/17  4:30 AM   Result Value Ref Range    Sodium 138 136 - 145 mmol/L    Potassium 3.6 3.5 - 5.5 mmol/L    Chloride 104 100 - 108 mmol/L    CO2 24 21 - 32 mmol/L    Anion gap 10 3.0 - 18 mmol/L    Glucose 95 74 - 99 mg/dL    BUN 7 7.0 - 18 MG/DL    Creatinine 0.72 0.6 - 1.3 MG/DL    BUN/Creatinine ratio 10 (L) 12 - 20      GFR est AA >60 >60 ml/min/1.73m2    GFR est non-AA >60 >60 ml/min/1.73m2    Calcium 8.3 (L) 8.5 - 10.1 MG/DL    Bilirubin, total 0.7 0.2 - 1.0 MG/DL    ALT (SGPT) 61 (H) 13 - 56 U/L    AST (SGOT) 42 (H) 15 - 37 U/L    Alk.  phosphatase 107 45 - 117 U/L    Protein, total 6.1 (L) 6.4 - 8.2 g/dL    Albumin 3.4 3.4 - 5.0 g/dL    Globulin 2.7 2.0 - 4.0 g/dL    A-G Ratio 1.3 0.8 - 1.7     MAGNESIUM    Collection Time: 04/23/17  4:30 AM   Result Value Ref Range    Magnesium 2.0 1.6 - 2.6 mg/dL   PHOSPHORUS    Collection Time: 04/23/17  4:30 AM   Result Value Ref Range    Phosphorus 1.9 (L) 2.5 - 4.9 MG/DL   PLATELETS, ALLOCATE    Collection Time: 04/23/17  8:00 AM   Result Value Ref Range    Unit number [id number]     Blood component type PLPH LRIR,3     Unit division 00     Status of unit ALLOCATED     Unit number [id number]     Blood component type PLTPH LR,2     Unit division 00     Status of unit ALLOCATED    SODIUM    Collection Time: 04/23/17 10:08 AM   Result Value Ref Range    Sodium 143 136 - 145 mmol/L

## 2017-04-23 NOTE — ROUTINE PROCESS
0700: Assumed care of pt at this time. Received verbal bedside shift report from Mary (offgoing) to [facility] RN (oncoming). Dual RN neuro assessment complete. 1200: PICC RN at the bedside.

## 2017-04-23 NOTE — H&P
History and Physical    Patient: Jordan Lopez               Sex: female          DOA: 4/22/2017       YOB: 1970      Age:  50 y.o.        LOS:  LOS: 0 days        HPI:     Jordan Lopez is a 50 y.o. female. Patient presented to ED secondary to being found unresponsive at home. She was brought in to [facility], where it is documented that she remained unresponsive. A CT of head done at the time of her arrival showed left ICA stroke. Patient is currently at [facility] via transfer. She is alert, however, she is non-verbal.  Patient is unable to contribute to her history. Past Medical History:   Diagnosis Date    Alcoholism ([facility] [address]     Anemia NEC     Anxiety     Arthralgia     Cerebral infarction due to occlusion of left internal carotid artery (HCC) 4/22/2017    Constipation     Depression     Fatigue     for more than 11 years    Fibromyalgia     GERD (gastroesophageal reflux disease)     Hypercholesterolemia     Hypertension     Leukocytosis     Migraines     Nodule of left lung     on CT scan       Past Surgical History:   Procedure Laterality Date    ABDOMEN SURGERY PROC UNLISTED  11/2007    Mini abdominoplasty    HX BREAST AUGMENTATION  1994    HX PELVIC LAPAROSCOPY      for endometriosis       Social History     Social History    Marital status: LEGALLY      Spouse name: N/A    Number of children: N/A    Years of education: N/A     Occupational History    Not on file. Social History Main Topics    Smoking status: Current Every Day Smoker     Packs/day: 0.50    Smokeless tobacco: Current User    Alcohol use No      Comment: Currently on cessation.     Drug use: No    Sexual activity: No     Other Topics Concern    Not on file     Social History Narrative       Family History   Problem Relation Age of Onset    COPD Father     High Cholesterol Mother     Other Mother      Reflux    Cancer Other      colon Allergies   Allergen Reactions    Lisinopril Cough    Penicillins Rash       Review of Systems:  Positive in bold. Unable to obtain    Physical Exam:      Visit Vitals    /71    Pulse 83    Resp 18    Ht 5' 7\" (1.702 m)    Wt 60.6 kg (133 lb 9.6 oz)    SpO2 95%    BMI 20.92 kg/m2       Physical Exam:  Gen: patient is awake. Follows somecommands, however can only move left side. Not able to speak. Cannot track/follow past midline. Does not respond to painful stimulus on right side. HEENT:  Normal cephalic atraumatic, extra-occular movements are intact. Neck:  Supple, No JVD  Lungs:  Clear bilaterally, no wheeze, no rales, normal effort  Cardiovascular:  Regular Rate and Rhythm, normal S1 and S2, no audible murmur. Capillary refill: < 3 seconds. Abdomen:  Soft, non distended, normal bowel sounds, no guarding. Extremities:  Well perfused, no cyanosis, no edema   Peripheral pulse: 2+  Neurological:  Awake and alert, right upper and lower extremity deficit. Skin:  No rashes or moles. Turgor and color normal  Mental Status:unable to assess, patient nonverbal      Laboratory Studies: All lab results for the last 24 hours reviewed. Assessment/Plan     Active Problems:    Acute ischemic left MCA stroke ([facility] [address] (4/22/2017)        PLAN:    51 yo female transferred from [address] [place] [address],[address] [id number] secondary to acute infarcts of left ICA and left MCA. Mass effect with midline shift shown.     Neuro: Admit patient to ICU   Continue on mannitol   MRI brain with contrast   Fall precautions   Ambulate with assistance   Neurovascular checks   PT/ OT to evaluate and treat    CV: HTN - currently controlled   Continue antihypertensive mediation as per home   Monitor vitals as per unit   EKG in am     Heme:  DVT prophylaxis   Lovenox 40 mg SQ daily   Bilateral lower extremity compression devices    GI: GI prophylaxis   Protonix 40 mg IV   Keep NPO    Misc:  FULL CODE   Monitor intake and output   Monitor vitals as per unit   Replace electrolytes as needed. Follow up am labs.           Neville Castellanos MD

## 2017-04-23 NOTE — PROGRESS NOTES
patient opens eyes spontaneously, no speech. L arm spontaneous no drift, L leg nonpurposeful. RUE and RLE withdraw to painful stimulus    home medications brought in my mother of patient. counted with Charge nurse and all accounted for. patient transported to MRI by charge nurse [phone]  radiologist called to notify of Large anterior L MCA infarct, no signs of hemorrhage. full report pending    0020  spoke with Dr. Flora Hines to determine if there were any particular orders needed for neurosurgery. notified that patient was receiving mannitol continued from [facility]. no new orders at this time. Bedside and Verbal shift change report given to Bucky Salinas RN (oncoming nurse) by Mildred Ardon RN   (offgoing nurse). Report included the following information SBAR, Kardex, Intake/Output, MAR and Recent Results.

## 2017-04-23 NOTE — PROGRESS NOTES
On call SLP acknowledging SLP evaluation orders. Will address later this date.       Thank you for this referral,   Garland Garrison M.S., [address] [facility]  Speech-Language Pathologist

## 2017-04-23 NOTE — PROGRESS NOTES
[facility]   Discharge Planning/ Assessment    Reasons for Intervention: Chart reviewed. Met with pt/family, pt sleeping soundly, spoke with pt's motherTHOM on face sheet, Sharda . States pt has NO ins. States pt sees NP for medical care. Lives with her boyfriend. Very active, uses NO DME. Independent with ADL's prior to admit. Made her aware that if pt will need rehab, we can try [facility] in-pt rehab, as pt has no ins, no payer source for rehab, she is agreeable, if needed. PLAN: home VS rehab depending on progress. Will cont to follow. Pat [address] [address]. [address].     High Risk Criteria  [x] Yes  []No   Physician Referral  [] Yes  [x]No        Date    Nursing Referral  [] Yes  [x]No        Date    Patient/Family Request  [] Yes  [x]No        Date       Resources:    Medicare  [] Yes  [x]No   Medicaid  [] Yes  [x]No   No Resources  [x] Yes  []No   Private Insurance  [] Yes  [x]No    Name/Phone Number    Other  [] Yes  [x]No        (i.e. Workman's Comp)         Prior Services:    Prior Services  [] Yes  [x]No   Home Health  [] Yes  [x]No   [address] [place]  [] Yes  [x]No        Number of [address] Program  [] Yes  [x]No       Meals on Wheels  [] Yes  [x]No   Office on Aging  [] Yes  [x]No   Transportation Services  [] Yes  [x]No   Nursing Home  [] Yes  [x]No        Nursing Home Name    [address]  [] Yes  [x]No        [address] Name    Other       Information Source:      Information obtained from  [x] Patient  [x] Parent   [] [address]  [] Child  [] Spouse   [] Significant Other/Partner   [] Friend      [] EMS    [] Nursing Home Chart          [] Other:   Chart Review  [x] Yes  []No     Family/Support System:    Patient lives with  [] Alone    [] Spouse   [x] Significant Other  [] Children  [] Caretaker   [] Parent  [] Sibling     [] Other       Other Support System:    Is the patient responsible for care of others  [] Yes  []No   Information of person caring for patient on  discharge    Managers financial affairs independently  [x] Yes  []No   If no, explain:      Status Prior to Admission:    Mental Status  [x] Awake  [x] Alert  [x] Oriented  [x] Quiet/Calm [] Lethargic/Sedated   [] Disoriented  [] Restless/Anxious  [] Combative   Personal Care  [] Dependent  [x] [address]  [] Requires Assistance   Meal Preparation Ability  [x] Independent   [] Standby Assistance   [] Minimal Assistance   [] Moderate Assistance  [] Maximum Assistance     [] Total Assistance   Chores  [x] Independent with Chores   [] N/A Nursing Home Resident   [] Requires Assistance   Bowel/Bladder  [x] Continent  [] Catheter  [] Incontinent  [] Ostomy Self-Care    [] Urine Diversion Self-Care  [] Maximum Assistance     [] Total Assistance   Number of Persons needed for assistance    DME at home  [] EugNubia Mahan  [] Eugena Pool, Straight   [] Commode    [] Bathroom/Grab Bars  [] Hospital Bed  [] Nebulizer  [] Oxygen           [] Raised Toilet Seat  [] Shower Chair  [] Side Rails for Bed   [] Tub Transfer Bench   [] German Pinzon  [] [address]      [] Other:   Vendor      Treatment Presently Receiving:    Current Treatments  [] Chemotherapy  [] Dialysis  [] Insulin  [] IVAB [x] IVF   [] O2  [] PCA   [x] PT   [] RT   [] Tube Feedings   [] Wound Care     Psychosocial Evaluation:    Verbalized Knowledge of Disease Process  [] Patient  []Family   Coping with Disease Process  [] Patient  []Family   Requires Further Counseling Coping with Disease Process  [] Patient  []Family     Identified Projected Needs:    Home Health Aid  [] Yes  [x]No   Transportation  [] Yes  [x]No   Education  [] Yes  [x]No        Specific Education     Financial Counseling  [] Yes  [x]No   Inability to Care for Self/Will Require 24 hour care  [] Yes  [x]No   Pain Management  [] Yes  [x]No   Home Infusion Therapy  [] Yes  [x]No   Oxygen Therapy  [] Yes  [x]No   DME  [] Yes [x]No   Long Term Care Placement  [] Yes  [x]No   Rehab  [] Yes  [x]No   Physical Therapy  [] Yes  [x]No   Needs Anticipated At This Time  [] Yes  [x]No     Intra-Hospital Referral:    [address]  [] Yes  [x]No     [] Yes  [x]No   Patient Representative  [] Yes  [x]No   Staff for Teaching Needs  [] Yes  [x]No   Specialty Teaching Needs     Diabetic Educator  [] Yes  [x]No   Referral for Diabetic Educator Needed  [] Yes  [x]No  If Yes, place order for Nutritionist or Diabetic Consult     Tentative Discharge Plan:    Home with No Services  [x] Yes  []No   Home with [address]  [] Yes  [x]No        If Yes, specify type    [address]  [] Yes  [x]No        If Yes, specify type    Meals on Wheels  [] Yes  [x]No   Office of Aging  [] Yes  [x]No   NHP  [] Yes  [x]No   Return to the Nursing Home  [] Yes  [x]No   Rehab Therapy  [] Yes  [x]No   Acute Rehab  [] Yes  [x]No   Subacute Rehab  [] Yes  [x]No   Private Care  [] Yes  [x]No   Substance Abuse Referral  [] Yes  [x]No   Transportation  [] Yes  [x]No   Chore Service  [] Yes  [x]No   Inpatient Hospice  [] Yes  [x]No   OP RT  [] Yes  [x] No   OP Hemo  [] Yes  [x] No   OP PT  [] Yes  [x]No   Support Group  [] Yes  [x]No   Reach to Recovery  [] Yes  [x]No   OP Oncology Clinic  [] Yes  [x]No   Clinic Appointment  [] Yes  [x]No   DME  [] Yes  [x]No   Comments    Name of D/C Planner or  Given to Patient or Family Xochilt Henry   Phone Number Pager: [phone]        [address] [place] [address]   Date 04-   Time    If you are discharged home, whom do you designate to participate in your discharge plan and receive any information needed?      Enter name of [facility]        Phone # of [place] [phone] / [phone]        Address of [place]         Updated         Patient refused to designate any           individual

## 2017-04-23 NOTE — CONSULTS
Yousif Marko    Name:  Lake Pascal  MR#:  [id number]  :  1970  Account #:  [de-identified]  Date of Adm:  2017  Date of Consultation:  2017      REASON FOR CONSULTATION: Left hemispheric stroke. CONSULTING PHYSICIAN: Monica Diop MD.    REQUESTING PHYSICIAN: Dr. Krystyna Mc. HISTORY: This patient is a 66-year-old female. She was unarousable  and noted to be plegic on her right side. EMS was activated and she  was brought to [facility]. A CT scan of the head revealed  a hyperdense sign of arterial occlusion in the left MCA ICA distribution. Dr. Katrin Rdz was notified. She was transferred over for management. In the ICU today she has altered mental status and hemiplegia. Her mother  and sister are in the room with her. In addition to past medical history as above, she is 1/2 pack a day  smoker. She is currently not using alcohol. FAMILY HISTORY: COPD in her father. Hypercholesterolemia in  mother. Reflux and colon cancer, also run in her family. MEDICATIONS: Prior to admission she was on:  1. Hydrochlorothiazide. 2. Lamictal.  3. Promethazine. 4. Sertraline. 5. Ambien. ALLERGIES: SHE IS ALLERGIC TO:  1. LISINOPRIL. 2. PENICILLIN. PHYSICAL EXAMINATION  VITAL SIGNS: Blood pressure 120/70, pulse 77, respirations 18, 95%  on room air. She is somnolent but will rouses to verbal stimuli. She is following  some commands with left upper and lower intermittently. She has a  dense hemiplegia was only trace movement in the feet on the right. Pupils are 4 mm, restrict down to 2. There is some hippus  bilaterally. Facial droop was present on the right. There is no stigmata  of trauma. Again, her mother and sister are present. On reflex exam toes upgoing on the right side and equivocal on the  left. Sensory exam is deferred. LABORATORY DATA: White count is 13.3, hemoglobin and  hematocrit 12.8 and 36.8, platelets are 300.  INR is 1. 2. PT, PTT 14.6  and 42.7. Sodium 138, potassium 3.6, chloride is 104, bicarbonate 24,  BUN is 7. Creatinine is 0.72. CTs from yesterday, today and the MRI from yesterday reviewed. Very  clear that there is about 6 mm of shift on today's scan (left-to-right) with  more clearly delineated infarct region. 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 PLAN: I had a long discussion with the patient's mother and sister. Dr. Anahi Ch and I have discussed the case. I was asked to consult due to the  potential necessity for hemicraniectomy as a life saving procedure. There is a reasonable chance that we will indeed need to proceed with  this treatment at some point. Right now she is on hypertonic saline and  I observe her. I explained what the procedure is and the reason for  doing it. I also went through process of cranioplasty once the swelling is  resolved. I have explained that this is not going to bring back any of the  infarcted brain, but could potentially help preserve some brain function  and will invariably make it easier to manage the swelling if it is  necessary. They displayed a reasonable understanding of what was  said. I have used layman's terms to help enhance understanding. I am  on standby if the procedure is necessary.         MD ALONZO Dupont / Dayan Reed  D:  04/23/2017   09:14  T:  04/23/2017   10:02  Job #:  [id number]

## 2017-04-23 NOTE — PROGRESS NOTES
Medicine Progress Note    Patient: Lisa Harvey   Age:  50 y.o.  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 - neurosurg watching for now, if worsens will need hemicraniectomy. 2)  HLD   - continue lipitor    3)  GERD   - on Protonix IV BID      Anticipated Date of Discharge: ???  Anticipated Disposition (home, SNF) : ???    Subjective:   Patient seen and examined. No verbal response, will open eyes with tactile stimulation. Will move L side and look left, minimal movement R hand    Objective:     Visit Vitals    /70    Pulse 77    Temp 99.1 °F (37.3 °C)    Resp 18    Ht 5' 7\" (1.702 m)    Wt 60.6 kg (133 lb 9.6 oz)    SpO2 95%    BMI 20.92 kg/m2       Physical Exam:  General appearance: lethargic, awakes with tactile stimulation. Head: Normocephalic, without obvious abnormality, atraumatic  Neck: supple, trachea midline  Lungs: clear to auscultation bilaterally  Heart: regular rate and rhythm, S1, S2 normal, no murmur, click, rub or gallop  Abdomen: soft, non-tender.  Bowel sounds normal. No masses,  no organomegaly  Extremities: extremities normal, atraumatic, no cyanosis or edema  Skin: Skin color, texture, turgor normal. No rashes or lesions  Neurologic: R side leg and arm with minimal movement, L side appears preserved, will look left with tactile stimulation.     Intake and Output:  Current Shift:     Last three shifts:  04/21 1901 - 04/23 0700  In: 852.9 [I.V.:852.9]  Out: 1200 [Urine:1200]    Lab/Data Reviewed:  BMP:   Lab Results   Component Value Date/Time     04/23/2017 04:30 AM    K 3.6 04/23/2017 04:30 AM     04/23/2017 04:30 AM    CO2 24 04/23/2017 04:30 AM    AGAP 10 04/23/2017 04:30 AM    GLU 95 04/23/2017 04:30 AM    BUN 7 04/23/2017 04:30 AM    CREA 0.72 04/23/2017 04:30 AM    GFRAA >60 04/23/2017 04:30 AM    GFRNA >60 04/23/2017 04:30 AM     CMP:   Lab Results   Component Value Date/Time     04/23/2017 04:30 AM    K 3.6 04/23/2017 04:30 AM     04/23/2017 04:30 AM    CO2 24 04/23/2017 04:30 AM    AGAP 10 04/23/2017 04:30 AM    GLU 95 04/23/2017 04:30 AM    BUN 7 04/23/2017 04:30 AM    CREA 0.72 04/23/2017 04:30 AM    GFRAA >60 04/23/2017 04:30 AM    GFRNA >60 04/23/2017 04:30 AM    CA 8.3 (L) 04/23/2017 04:30 AM    MG 2.0 04/23/2017 04:30 AM    PHOS 1.9 (L) 04/23/2017 04:30 AM    ALB 3.4 04/23/2017 04:30 AM    TP 6.1 (L) 04/23/2017 04:30 AM    GLOB 2.7 04/23/2017 04:30 AM    AGRAT 1.3 04/23/2017 04:30 AM    SGOT 42 (H) 04/23/2017 04:30 AM    ALT 61 (H) 04/23/2017 04:30 AM     CBC:   Lab Results   Component Value Date/Time    WBC 13.3 (H) 04/23/2017 04:30 AM    HGB 12.8 04/23/2017 04:30 AM    HCT 36.8 04/23/2017 04:30 AM     04/23/2017 04:30 AM     All Cardiac Markers in the last 24 hours: No results found for: CPK, CKMMB, CKMB, RCK3, CKMBT, CKNDX, CKND1, COLBY, TROPT, TROIQ, MATT, TROPT, TNIPOC, BNP, BNPP    Medications Reviewed:  Current Facility-Administered Medications   Medication Dose Route Frequency    hypertonic saline 3 % (NEURO SCIENCE USE ONLY) infusion  0.25-1 mL/kg/hr IntraVENous TITRATE    dextrose 5% - 0.9% NaCl with KCl 40 mEq/L infusion   IntraVENous CONTINUOUS    potassium chloride 10 mEq in 100 ml IVPB  10 mEq IntraVENous Q1H    dextrose 5% and 0.9% NaCl infusion  0.75 mL/kg/hr IntraVENous CONTINUOUS    ondansetron (ZOFRAN) injection 1 mg  1 mg IntraVENous Q6H PRN    atorvastatin (LIPITOR) tablet 80 mg  80 mg Oral QHS    acetaminophen (TYLENOL) tablet 650 mg  650 mg Oral Q4H PRN    acetaminophen (TYLENOL) suppository 650 mg  650 mg Rectal Q4H PRN    senna-docusate (PERICOLACE) 8.6-50 mg per tablet 2 Tab  2 Tab Oral QHS    pantoprazole (PROTONIX) 40 mg in sodium chloride 0.9 % 10 mL injection  40 mg IntraVENous Q12H    albuterol (PROVENTIL VENTOLIN) nebulizer solution 2.5 mg  2.5 mg Nebulization W5Y PRN    folic acid (FOLVITE) 1 mg, thiamine (B-1) 100 mg in 0.9% sodium chloride 50 mL ivpb   IntraVENous DAILY       Sarah Phelps MD    April 23, 2017

## 2017-04-23 NOTE — ROUTINE PROCESS
TRANSFER - IN REPORT:    Verbal report received from [address] RN(name) on Helane English  being received from 2601(unit) for routine progression of care      Report consisted of patients Situation, Background, Assessment and   Recommendations(SBAR). Information from the following report(s) SBAR, Kardex and MAR was reviewed with the receiving nurse. Opportunity for questions and clarification was provided. Assessment completed upon patients arrival to unit and care assumed.

## 2017-04-23 NOTE — PROGRESS NOTES
Contacted by Nursing 2600  Patient now comfort care per Neurointerventional / neurology  DNR  Comfort care orderset in place  i have placed palliative consult-  Will call in am as palliative usually not present on weekend  Transfer to med floor.

## 2017-04-23 NOTE — PROGRESS NOTES
Neurovascular Progress Note      Patient: Triston Momin MRN: [id number]  SSN: [id number]    YOB: 1970  Age: 50 y.o. Sex: female      Events  CT about 8 hours after the MRI shows expected evolution of LICA territory infarct with increasing mass effect causing 8mm of left to right midline shift.     Vital Signs  Patient Vitals for the past 24 hrs:   Temp Pulse Resp BP SpO2   04/23/17 1500 - 87 17 123/69 97 %   04/23/17 1400 - 87 20 138/77 97 %   04/23/17 1300 - 83 18 135/76 98 %   04/23/17 1200 100.1 °F (37.8 °C) 81 17 128/71 97 %   04/23/17 1100 - 79 17 119/71 97 %   04/23/17 1000 - 81 19 121/76 95 %   04/23/17 0900 - 79 17 121/75 96 %   04/23/17 0800 99.2 °F (37.3 °C) 77 17 122/70 95 %   04/23/17 0700 - 77 18 120/70 95 %   04/23/17 0635 - 81 14 131/81 95 %   04/23/17 0500 - 75 17 124/71 94 %   04/23/17 0441 99.1 °F (37.3 °C) 78 16 131/75 96 %   04/23/17 0400 - 75 17 - 96 %   04/23/17 0300 - 77 20 103/70 95 %   04/23/17 0200 - 77 17 112/72 95 %   04/23/17 0100 - 80 17 120/73 97 %   04/23/17 0001 - 80 15 - 98 %   04/23/17 0000 99 °F (37.2 °C) 81 20 117/72 95 %   04/22/17 2300 - 80 17 111/74 96 %   04/22/17 2223 - 80 18 - 96 %   04/22/17 2221 - - - 122/76 -   04/22/17 2101 - 89 20 128/77 96 %   04/22/17 2100 - 88 19 - 96 %   04/22/17 2032 - 83 18 115/71 95 %   04/22/17 2008 - 79 17 - 96 %       NIHSS Flow Sheet  Total: 26 (04/23/17 1200)     Vent         Intake and Output    Intake/Output Summary (Last 24 hours) at 04/23/17 1612  Last data filed at 04/23/17 1300   Gross per 24 hour   Intake          1407.93 ml   Output             1570 ml   Net          -162.07 ml       Data    Recent Results (from the past 24 hour(s))   LIPID PANEL    Collection Time: 04/22/17 10:45 PM   Result Value Ref Range    LIPID PROFILE          Cholesterol, total 198 <200 MG/DL    Triglyceride 128 <150 MG/DL    HDL Cholesterol 41 40 - 60 MG/DL    LDL, calculated 131.4 (H) 0 - 100 MG/DL    VLDL, calculated 25.6 MG/DL CHOL/HDL Ratio 4.8 0 - 5.0     HEMOGLOBIN A1C WITH EAG    Collection Time: 04/22/17 10:45 PM   Result Value Ref Range    Hemoglobin A1c 5.3 4.2 - 5.6 %    Est. average glucose 105 mg/dL   SED RATE (ESR)    Collection Time: 04/22/17 10:45 PM   Result Value Ref Range    Sed rate, automated 10 0 - 20 mm/hr   TSH 3RD GENERATION    Collection Time: 04/22/17 10:45 PM   Result Value Ref Range    TSH 0.66 0.36 - 3.74 uIU/mL   T3, FREE    Collection Time: 04/22/17 10:45 PM   Result Value Ref Range    Triiodothyronine (T3), free 1.8 (L) 2.3 - 4.2 PG/ML   T4, FREE    Collection Time: 04/22/17 10:45 PM   Result Value Ref Range    T4, Free 0.8 0.7 - 1.5 NG/DL   GLUCOSE, POC    Collection Time: 04/23/17  3:43 AM   Result Value Ref Range    Glucose (POC) 120 (H) 70 - 110 mg/dL   PROTHROMBIN TIME + INR    Collection Time: 04/23/17  4:30 AM   Result Value Ref Range    Prothrombin time 14.6 11.5 - 15.2 sec    INR 1.2 0.8 - 1.2     PTT    Collection Time: 04/23/17  4:30 AM   Result Value Ref Range    aPTT 42.7 (H) 23.0 - 36.4 SEC   TYPE & SCREEN    Collection Time: 04/23/17  4:30 AM   Result Value Ref Range    Crossmatch Expiration 04/26/2017     ABO/Rh(D) A POSITIVE     Antibody screen NEG    CBC WITH AUTOMATED DIFF    Collection Time: 04/23/17  4:30 AM   Result Value Ref Range    WBC 13.3 (H) 4.6 - 13.2 K/uL    RBC 3.36 (L) 4.20 - 5.30 M/uL    HGB 12.8 12.0 - 16.0 g/dL    HCT 36.8 35.0 - 45.0 %    .5 (H) 74.0 - 97.0 FL    MCH 38.1 (H) 24.0 - 34.0 PG    MCHC 34.8 31.0 - 37.0 g/dL    RDW 13.7 11.6 - 14.5 %    PLATELET 482 488 - 658 K/uL    MPV 9.6 9.2 - 11.8 FL    NEUTROPHILS 83 (H) 40 - 73 %    LYMPHOCYTES 12 (L) 21 - 52 %    MONOCYTES 5 3 - 10 %    EOSINOPHILS 0 0 - 5 %    BASOPHILS 0 0 - 2 %    ABS. NEUTROPHILS 10.9 (H) 1.8 - 8.0 K/UL    ABS. LYMPHOCYTES 1.6 0.9 - 3.6 K/UL    ABS. MONOCYTES 0.7 0.05 - 1.2 K/UL    ABS. EOSINOPHILS 0.0 0.0 - 0.4 K/UL    ABS.  BASOPHILS 0.0 0.0 - 0.06 K/UL    DF AUTOMATED     METABOLIC PANEL, COMPREHENSIVE    Collection Time: 04/23/17  4:30 AM   Result Value Ref Range    Sodium 138 136 - 145 mmol/L    Potassium 3.6 3.5 - 5.5 mmol/L    Chloride 104 100 - 108 mmol/L    CO2 24 21 - 32 mmol/L    Anion gap 10 3.0 - 18 mmol/L    Glucose 95 74 - 99 mg/dL    BUN 7 7.0 - 18 MG/DL    Creatinine 0.72 0.6 - 1.3 MG/DL    BUN/Creatinine ratio 10 (L) 12 - 20      GFR est AA >60 >60 ml/min/1.73m2    GFR est non-AA >60 >60 ml/min/1.73m2    Calcium 8.3 (L) 8.5 - 10.1 MG/DL    Bilirubin, total 0.7 0.2 - 1.0 MG/DL    ALT (SGPT) 61 (H) 13 - 56 U/L    AST (SGOT) 42 (H) 15 - 37 U/L    Alk. phosphatase 107 45 - 117 U/L    Protein, total 6.1 (L) 6.4 - 8.2 g/dL    Albumin 3.4 3.4 - 5.0 g/dL    Globulin 2.7 2.0 - 4.0 g/dL    A-G Ratio 1.3 0.8 - 1.7     MAGNESIUM    Collection Time: 04/23/17  4:30 AM   Result Value Ref Range    Magnesium 2.0 1.6 - 2.6 mg/dL   PHOSPHORUS    Collection Time: 04/23/17  4:30 AM   Result Value Ref Range    Phosphorus 1.9 (L) 2.5 - 4.9 MG/DL   PLATELETS, ALLOCATE    Collection Time: 04/23/17  8:00 AM   Result Value Ref Range    Unit number [id number]     Blood component type PLPH LRIR,3     Unit division 00     Status of unit ALLOCATED     Unit number [id number]     Blood component type PLTPH LR,2     Unit division 00     Status of unit ALLOCATED    SODIUM    Collection Time: 04/23/17 10:08 AM   Result Value Ref Range    Sodium 143 136 - 145 mmol/L      Physical Exam  Supine, HOB about 20 degrees. Eyelids open spontaneously. No commands. No speech. Eyes to left. Head to left. Right arm/leg plegic. Left arm purposeful. Left leg withdraws. Assessment/Plan  50year-old female with LICA occlusion causing a large left cerebral hemisphere ischemic stroke. Discussed with , daughter, and mother at bedside. Dr. Cruz Banks kindly joined us. We discussed the expected natural progression to brain herniation, coma, and death.  With hemicraniectomy she might live but would have severe deficits and would not likely be independent. All three of her family members said the patient has previously expressed that she would not want any heroic measures nor work with therapists toward recovery. They state she would want to let nature take its course under these circumstances. This will be done. 40 minutes of neurocritical provided independent of procedures.     Kailey Reynolds MD

## 2017-04-23 NOTE — PROGRESS NOTES
Orders received and pt's chart reviewed. Per discussion with nursing and chart review pt is possibly going to receive a hemicraniectomy. Nursing requested PT hold treatment at this time. PT will hold evaluation and treatment until tomorrow and continue to follow.     Thank you for this referral,  Tucker Canada, SPT  Bunny Cha, PT

## 2017-04-23 NOTE — PROGRESS NOTES
SLP evaluation/tx orders received;however, per discussion with RN, pt not appropriate for po trials at this time. Will follow up next business date.      Thank you for this referral,   Xenia Steele M.S., [id number] [facility]  Speech-Language Pathologist

## 2017-04-24 PROBLEM — R53.81 DEBILITY: Status: ACTIVE | Noted: 2017-01-01

## 2017-04-24 PROBLEM — R63.30 FEEDING DIFFICULTIES: Status: ACTIVE | Noted: 2017-01-01

## 2017-04-24 NOTE — PROGRESS NOTES
Medicine Progress Note    Patient: Tex Gonzales   Age:  50 y.o.  DOA: 4/22/2017   Admit Dx / CC: Acute Ischemic Left LUZ stroke  Acute ischemic left MCA stroke (HCC)  LOS:  LOS: 2 days     Assessment/Plan   Principal Problem:    LICA occlusion with ischemia and infarct (4/22/2017)    Active Problems:    Essential hypertension (4/10/2017)      LACA occlusion with ischemia and infarct (4/22/2017)      LMCA occlusion with ischemia and infarct (4/22/2017)      Hypercholesterolemia (4/23/2017)      GERD (gastroesophageal reflux disease) (4/23/2017)      Smoking (4/23/2017)      Cerebral edema ([address] (4/23/2017)      Brain compression ([address] (4/23/2017)        Additional Plan notes     1)  Acute CVA/cerebral edema/braine compression   - Patient now dnr   - comfort care orderset placed   - Palliative care consulted    2)  HLD   - continue lipitor    3)  GERD   - on Protonix IV BID      Anticipated Date of Discharge: 4/24-25  Anticipated Disposition (home, SNF) : home vs placement    Subjective:   Patient seen and examined. No verbal response. Objective:     Visit Vitals    /80 (BP 1 Location: Right arm, BP Patient Position: At rest)    Pulse 89    Temp 98.8 °F (37.1 °C)    Resp 18    Ht 5' 7\" (1.702 m)    Wt 60.3 kg (133 lb)    SpO2 93%    BMI 20.83 kg/m2       Physical Exam:  General appearance: lethargic, awakes with tactile stimulation. Head: Normocephalic, without obvious abnormality, atraumatic  Neck: supple, trachea midline  Lungs: clear to auscultation bilaterally  Heart: regular rate and rhythm, S1, S2 normal, no murmur, click, rub or gallop  Abdomen: soft, non-tender. Bowel sounds normal. No masses,  no organomegaly  Extremities: extremities normal, atraumatic, no cyanosis or edema  Skin: Skin color, texture, turgor normal. No rashes or lesions  Neurologic: R side leg and arm with minimal movement, L side appears preserved, will look left with tactile stimulation.     Intake and Output:  Current Shift:     Last three shifts:  04/22 1901 - 04/24 0700  In: 1407.9 [I.V.:1407.9]  Out: 1570 [Urine:1570]    Lab/Data Reviewed:  BMP:   No results found for: NA, K, CL, CO2, AGAP, GLU, BUN, CREA, GFRAA, GFRNA  CMP:   No results found for: NA, K, CL, CO2, AGAP, GLU, BUN, CREA, GFRAA, GFRNA, CA, MG, PHOS, ALB, TBIL, TP, ALB, GLOB, AGRAT, SGOT, ALT, GPT  CBC:   No results found for: WBC, HGB, HGBEXT, HCT, HCTEXT, PLT, PLTEXT, HGBEXT, HCTEXT, PLTEXT  All Cardiac Markers in the last 24 hours: No results found for: CPK, CKMMB, CKMB, RCK3, CKMBT, CKNDX, CKND1, COLBY, TROPT, TROIQ, MATT, TROPT, TNIPOC, BNP, BNPP    Medications Reviewed:  Current Facility-Administered Medications   Medication Dose Route Frequency    ondansetron (ZOFRAN) injection 4 mg  4 mg IntraVENous Q6H PRN    haloperidol (HALDOL) 2 mg/mL oral solution 2 mg  2 mg Oral Q4H PRN    promethazine (PHENERGAN) suppository 25 mg  25 mg Rectal Q6H PRN    morphine (ROXANOL) 100 mg/5 mL (20 mg/mL) concentrated solution 10 mg  10 mg Oral Q2H PRN    scopolamine (TRANSDERM-SCOP) 1.5 mg  1.5 mg TransDERmal Q72H    glycopyrrolate (ROBINUL) injection 0.2 mg  0.2 mg IntraVENous Q6H PRN    LORazepam (INTENSOL) 2 mg/mL oral concentrate 1 mg  1 mg Oral Q4H PRN    acetaminophen (TYLENOL) suppository 650 mg  650 mg Rectal Q4H PRN       Rosamaria Avila MD    April 24, 2017

## 2017-04-24 NOTE — PROGRESS NOTES
Patient received in bed with eyes closed. Resp even and nonlabored. Unable to assess cognition or orientation. BI negative with no apparent signs of pain or distress. Bed locked in low position and call bell w/in reach.

## 2017-04-24 NOTE — HOSPICE
[facility] referral received. Chart review/pt eval in progress. Will update after chart review is complete. Please contact [phone] with any questions or concerns regarding this patient. Thank you for referring to [facility]. Hospice RN Florentin Silverman called pt mother re hospice referral.  Equipment has been ordered for tomorrow delivery by noon. Per pt mother, pt to be going home tomorrow to her home with hospice.

## 2017-04-24 NOTE — PROGRESS NOTES
[facility]: [phone]  [facility]: [phone]   [facility]: [phone]    Patient Name: Brittany Webber  YOB: 1970    Date of Initial Consult: 4/24/2017   Reason for Consult: care decisions  Requesting Provider: Dr. Juan Duque  Primary Care Physician: Thong Boucher NP      SUMMARY:   Brittany Webber is a 50y.o. year old with a past history of alcoholism, anemia, constipation, fatigue, depression, hypertension, who was admitted on 4/22/2017 from home with a diagnosis of CVA. Current medical issues leading to Palliative Medicine involvement include: 50year old with CVA, seen by neurology and neurosurgery who have discussed with family, mother, daughter and  (by report) and family has decided on no further heroic measures. Palliative medicine is consulted for support and further discussions on care options. PALLIATIVE DIAGNOSES:   1. CVA  2. Feeding difficulties   3. Debility        PLAN:   1. Patient seen at bedside, x 2 this am. Her mother later joined at bedside. Mayte Guadarrama opens her eyes to her name, moving her left arm, did not follow commands for me. Social:  but estranged from , has 2 children son, who is incarcerated due to drugs, daughter who lives in [facility], one grand child. 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 Stopped due to ETOH. Has been to rehab, per mother no ETOH in 5 years. Currently not working. Lives with a friend. 2. Goals of care Patient is DNR/DNI and on comfort measures. Mayte Guadarrama has not executed an AMD, but has verbally told her mother what would be an acceptable quality of life. Patient is  but estranged from her  Tasia Motta. He is the medical power of . Patient's mother, reports Tasia Motta is in agreement with her code status and care decisions. Discussed level of care as we ensure Amisha's comfort.  Mother reports she would like for to return home with her. Hospice care discussed at length (mother has had experience with hospice). She is agreeable to this level of care. Hospice referral placed. DDNR signed by mother, she is aware we will also need 's signature, RN is aware to have  sign when he arrives. 3. CVA LICA occlusion causing large left cerebral hemisphere ischemic stroke. Has been seen by Dr. Juliana Singleton and Neuro, discussed with family. Now on comfort measures. 4. Feeding difficulties, per mother family does not wish for feeding tubes, temporary or permament. Mother from previous discussions with neurologist understands along with aphasia, she will also have dysphagia. Will ask speech to see for safest diet. Discussed at some length with mother, not sure she will take oral fluids and feeds enough to sustain for a long period of time. Feeds are for pleasure / comfort. 5. Debility fully ambulatory before CVA, currently moves left arm, does not follow commands  6. Initial consult note routed to primary continuity provider  7. Communicated plan of care with: Palliative IDT, attending, , mother       GOALS OF CARE:     [====Goals of Care====]  GOALS OF CARE:  Patient / health care proxy stated goals: comfort measures.  Possible home with hospice       TREATMENT PREFERENCES:   Code Status: DNR    Advance Care Planning:  Advance Care Planning 4/23/2017   Patient's Healthcare Decision Maker is: Legal Next of Beatriz 67   Primary Decision Maker Name Silvio Ramirez   Primary Decision Maker Phone Number [phone]   Primary Decision Maker Relationship to Patient Parent   Secondary Decision Maker Name -   Secondary Decision Maker Phone Number -   Confirm Advance Directive None       Other:    The palliative care team has discussed with patient / health care proxy about goals of care / treatment preferences for patient.  [====Goals of Care====]    Advance Care Planning 4/23/2017   Patient's Healthcare Decision Maker is: Legal Next of Kin   Primary Decision Maker Name Moustapha López   Primary Decision Maker Phone Number [phone]   Primary Decision Maker Relationship to Patient Parent   Secondary Decision Maker Name -   Secondary Decision Maker Phone Number -   Confirm Advance Directive None           HISTORY:     History obtained from: mother, chart     CHIEF COMPLAINT: cva    HPI/SUBJECTIVE:    The patient is:   [] Verbal and participatory  [x] Non-participatory due to: CVA   Please see summary    MRI of brain 4/22/2017   Large confluent area of infarct predominantly throughout the left frontal lobe with additional involvement of the left basal ganglia, insula, perisylvian temporal lobe, and small portion of the temporal lobe on this. These regions  correspond to portions of the left MCA and left LUZ territories. Mass effect is present including 5 mm of left-to-right midline shift. Continued close CT  follow-up is recommended as mass effect may worsen with increasing edema.     2. Abnormal flow void involving left internal carotid artery with questionable focus of susceptibility artifact at left carotid terminus/left M1 segment suspicious for thrombus although not definitive. FLAIR hyperintense vessels along left MCA representing slow collateral flow related to proximal high-grade stenosis/occlusion. Correlation with reported prior outside CTA (not available  for review) recommended.     3. Mild mass effect on the left lateral ventricle. Other ventricles within normal limits without hydrocephalus    . Clinical Pain Assessment (nonverbal scale for nonverbal patients): Pain: 0  Adult Non-Verbal Pain Assessment    Face  [x] 0   No particular expression or smile  [] 1   Occasional grimace, tearing, frowning, wrinkled forehead  [] 2   Frequent grimace, tearing, frowning, wrinkled forehead    Activity (movement)  [x] 0   Lying quietly, normal position  [] 1   Seeking attention through movement or slow, cautious movement  [] 2   Restless, excessive activity and/or withdrawal reflexes    Guarding  [x] 0   Lying quietly, no positioning of hands over areas of body  [] 1   Splinting areas of the body, tense  [] 2   Rigid, stiff    Physiology (vital signs)  [x] 0   Stable vital signs  [] 1   Change in any of the following: SBP > 20mm Hg; HR > 20/minute  [] 2   Change in any of the following: SBP > 30mm Hg; HR > 25/minute    Respiratory  [x] 0   Baseline RR/SpO2, compliant with ventilator  [] 1   RR > 10 above baseline, or 5% drop SpO2, mild asynchrony with ventilator  [] 2   RR > 20 above baseline, or 10% drop SpO2, asynchrony with ventilator    Total Non-Verbal Pain Score: 0       FUNCTIONAL ASSESSMENT:     Palliative Performance Scale (PPS): 20%          PSYCHOSOCIAL/SPIRITUAL SCREENING:     Advance Care Planning:  Advance Care Planning 4/23/2017   Patient's Healthcare Decision Maker is: Legal Next of Beatriz 67   Primary Decision Maker Name Cuong Lucas   Primary Decision Maker Phone Number [phone]   Primary Decision Maker Relationship to Patient Parent   Secondary Decision Maker Name -   Secondary Decision [address] Phone Number -   Confirm Advance Directive None        Any spiritual / Scientology concerns:  [] Yes /  [x] No    Caregiver Burnout:  [] Yes /  [x] No /  [] No Caregiver Present      Anticipatory grief assessment:   [x] Normal  / [] Maladaptive       ESAS Anxiety:      ESAS Depression:          REVIEW OF SYSTEMS:     Positive and pertinent negative findings in ROS are noted above in HPI. The following systems were [] reviewed / [x] unable to be reviewed as noted in HPI  Other findings are noted below. Systems: constitutional, ears/nose/mouth/throat, respiratory, gastrointestinal, genitourinary, musculoskeletal, integumentary, neurologic, psychiatric, endocrine. Positive findings noted below.   Modified ESAS Completed by: provider           Pain: 0           Dyspnea: 0                    PHYSICAL EXAM:     Wt Readings from Last 3 Encounters: 04/24/17 60.3 kg (133 lb)   04/22/17 61 kg (134 lb 7.7 oz)   04/10/17 60.3 kg (133 lb)     Blood pressure 129/80, pulse 89, temperature 98.8 °F (37.1 °C), resp. rate 18, height 5' 7\" (1.702 m), weight 60.3 kg (133 lb), last menstrual period 03/28/2017, SpO2 93 %.   Pain:  Pain Scale 1: Adult Nonverbal Pain Scale  Pain Intensity 1: 0                 Last bowel movement: not known    Constitutional: 50year old, who will open eyes to her name, non verbal, does not follow commands, lying in bed in NAD   Eyes: pupils equal, anicteric, gaze deviates to left   Respiratory: breathing not labored  Skin: warm, dry  Neurologic: opens eyes to her name, did not track in room for me or family, moves left arm, did not follow commands       HISTORY:     Principal Problem:    LICA occlusion with ischemia and infarct (4/22/2017)    Active Problems:    Essential hypertension (4/10/2017)      LACA occlusion with ischemia and infarct (4/22/2017)      LMCA occlusion with ischemia and infarct (4/22/2017)      Hypercholesterolemia (4/23/2017)      GERD (gastroesophageal reflux disease) (4/23/2017)      Smoking (4/23/2017)      Cerebral edema (HCC) (4/23/2017)      Brain compression ([address] (4/23/2017)      Past Medical History:   Diagnosis Date    Alcoholism ([address]     Anemia NEC     Anxiety     Arthralgia     Cerebral infarction due to occlusion of left internal carotid artery ([address] 4/22/2017    Constipation     Depression     Fatigue     for more than 11 years    Fibromyalgia     GERD (gastroesophageal reflux disease)     Hypercholesterolemia     Hypertension     Leukocytosis     Migraines     Nodule of left lung     on CT scan      Past Surgical History:   Procedure Laterality Date    ABDOMEN SURGERY PROC UNLISTED  11/2007    Mini abdominoplasty    HX BREAST AUGMENTATION  1994    HX PELVIC LAPAROSCOPY      for endometriosis      Family History   Problem Relation Age of Onset    COPD Father     High Cholesterol Mother  Other Mother      Reflux    Cancer Other      colon     History reviewed, no pertinent family history. Social History   Substance Use Topics    Smoking status: Current Every Day Smoker     Packs/day: 0.50    Smokeless tobacco: Current User    Alcohol use No      Comment: Currently on cessation. Allergies   Allergen Reactions    Lisinopril Cough    Penicillins Rash      Current Facility-Administered Medications   Medication Dose Route Frequency    morphine (ROXANOL) 100 mg/5 mL (20 mg/mL) concentrated solution 5 mg  5 mg Oral Q2H PRN    ondansetron (ZOFRAN) injection 4 mg  4 mg IntraVENous Q6H PRN    haloperidol (HALDOL) 2 mg/mL oral solution 2 mg  2 mg Oral Q4H PRN    promethazine (PHENERGAN) suppository 25 mg  25 mg Rectal Q6H PRN    scopolamine (TRANSDERM-SCOP) 1.5 mg  1.5 mg TransDERmal Q72H    glycopyrrolate (ROBINUL) injection 0.2 mg  0.2 mg IntraVENous Q6H PRN    LORazepam (INTENSOL) 2 mg/mL oral concentrate 1 mg  1 mg Oral Q4H PRN    acetaminophen (TYLENOL) suppository 650 mg  650 mg Rectal Q4H PRN        LAB AND IMAGING FINDINGS:     Lab Results   Component Value Date/Time    WBC 13.3 04/23/2017 04:30 AM    HGB 12.8 04/23/2017 04:30 AM    PLATELET 253 81/40/1047 04:30 AM     Lab Results   Component Value Date/Time    Sodium 143 04/23/2017 10:08 AM    Potassium 3.6 04/23/2017 04:30 AM    Chloride 104 04/23/2017 04:30 AM    CO2 24 04/23/2017 04:30 AM    BUN 7 04/23/2017 04:30 AM    Creatinine 0.72 04/23/2017 04:30 AM    Calcium 8.3 04/23/2017 04:30 AM    Magnesium 2.0 04/23/2017 04:30 AM    Phosphorus 1.9 04/23/2017 04:30 AM      Lab Results   Component Value Date/Time    AST (SGOT) 42 04/23/2017 04:30 AM    Alk.  phosphatase 107 04/23/2017 04:30 AM    Protein, total 6.1 04/23/2017 04:30 AM    Albumin 3.4 04/23/2017 04:30 AM    Globulin 2.7 04/23/2017 04:30 AM     Lab Results   Component Value Date/Time    INR 1.2 04/23/2017 04:30 AM    Prothrombin time 14.6 04/23/2017 04:30 AM    aPTT 42.7 04/23/2017 04:30 AM      No results found for: IRON, FE, TIBC, IBCT, PSAT, FERR   No results found for: PH, PCO2, PO2  No components found for: GLPOC   No results found for: CPK, CKMB           Total time: 70 minutes   Counseling / coordination time: 50 minutes   > 50% counseling / coordination?: yes     Prolonged service was provided for  []30 min   []75 min in face to face time in the presence of the patient. Time Start:   Time End:   Note: this can only be billed with 01129 (initial) or 29591 (follow up). If multiple start / stop times, list each separately.

## 2017-04-24 NOTE — ROUTINE PROCESS
Assumed care of patient at [facility] report received from Sandi ROGERS RN. Patient sleeping and no distress noted. Call bell within reach. Family present at the bedside. 4/25/17 - 0740 -Bedside and Verbal shift change report given to Minal Bhatia RN (oncoming nurse) by Shruti Gong RN BSN (offgoing nurse). Report given with SBAR, Kardex, Intake/Output, MAR and Recent Results.

## 2017-04-24 NOTE — PROGRESS NOTES
[address] April 24, 2017       RE: Chuyita Swan      To Whom It May Concern,    This is to certify that Chuyita Swan was admitted 4/22/2017 to [facility] with a large stroke (LICA occlusion). She has been seen by Neurologists who have no further interventions to offer her. She has been moved to hospice and palliative care teams with probable immanent death. Please feel free to contact my office if you have any questions or concerns. Thank you for your assistance in this matter.       Sincerely,  Murray Ogden, [facility]

## 2017-04-24 NOTE — ROUTINE PROCESS
Bedside and Verbal shift change report given to Salbador Kim RN (oncoming nurse) by Renetta Reinoso RN, BSN (offgoing nurse). Report given with SBAR, Kardex, Intake/Output, MAR and Recent Results.

## 2017-04-24 NOTE — PROGRESS NOTES
Patient is unable to communicate at this time as she has had a major stroke and is now on Palliative Care comfort Care. Sat in and listened as the Palliative care team talked to patients mother about the situation and the prognosis. Offered our continued support and presence in support for the family.  offered prayer and left Spiritual Care brochure. Chaplains will continue to follow and will provide pastoral care on an as needed/requested basis.     Olimpia Cartagena   Spiritual Care   [phone]

## 2017-04-24 NOTE — PROGRESS NOTES
Patient mother advised this nurse that she wants physician to contact [facility] regarding patient son coming to visit prior to patient (sons mother) passing. Advised Dr. Jordan Bhakta and numerous attempts were made, however, call was disconnected each time on the other end. Advised patients mother of attempts. Dr. Jordan Bhakta advised he will be happy to try again. Information is as follows: Son - Sadaf Brar II  Incarcerated at [facility]  Number of contact [phone] person of contact is Stanford Burdick. Spoke with Ryan Castellon in Risk Management and confirmed okay for physician to provide information to facility. [address] Bennett with Counselor of son at facility and directed the call to MARIO Lancaster to confirm patient on comfort measures.

## 2017-04-24 NOTE — PROGRESS NOTES
conducted a Follow-up consultation and Spiritual Assessment for Candy Maza, who is a 50 y.o.,female. Patients Primary Language is: [place]. According to the patients EMR Buddhism Affiliation is: Other. The patient is unable to communicate verbally, but she indicated by squeezing her hand that she can hear and comprehend. Patient's mother is Rastafarian, and she informed me that patient is baptized but has not practiced the af[place]. Patient did consent to receive the [address]. Another family member was also present. Family received assurance of continued prayer. Family members expressed gratitude for the visit. Chaplains are to continue to make follow-up visits as requested or needed. The Rev.  [facility] [place],   [address]  [facility] [phone] / [facility] [phone]

## 2017-04-24 NOTE — PROGRESS NOTES
Notified [facility] intake rep, Juláin Metcalf, of the referral for home hospice care. She will f/u with their hospice nurse liaison for review. Informed her the pt's contact is her mother,  Paula Quiroz # [phone].

## 2017-04-24 NOTE — ROUTINE PROCESS
Bedside and Verbal shift change report given to Valarie Boyce RN (oncoming nurse) by Davy Campbell RN   (offgoing nurse). Report included the following information SBAR, Kardex, MAR and Recent Results.

## 2017-04-25 NOTE — PROGRESS NOTES
[facility]  to [facility] via wheelchair escorted by [employer] and [facility] and patients mother. Security advised upon end of visit to contact security to be escorted from building. 1500 - To patient bedside to answer questions from son visiting prior to leaving. Concerns addressed and son verbalized understanding and pleased with care. 200 -  Patient son visitation completed.

## 2017-04-25 NOTE — PROGRESS NOTES
Assumed care of pt from night nurse Stew Bran RN. Received with patient in bed,resting. Patient does not show any signs of pain and/or discomfort. Call light in reach with mother at patients side, bed locked and in lowest position. Encouraged mother to call for assistance, verbalized understanding.

## 2017-04-25 NOTE — PROGRESS NOTES
Notified by [facility] liaison nurse, Tigre Garay, home hospice care has been coordinated, DME will be delivered today before transport home. Transport scheduled for 1700 today.

## 2017-04-25 NOTE — PROGRESS NOTES
[facility]: [phone]  [facility]: [phone]   [facility]: [phone]    Patient Name: Brittany Webber  YOB: 1970    Date of Initial Consult: 4/24/2017   Reason for Consult: care decisions  Requesting Provider: Dr. Juan Duque  Primary Care Physician: Thong Boucher NP      SUMMARY:   Brittany Webber is a 50y.o. year old with a past history of alcoholism, anemia, constipation, fatigue, depression, hypertension, who was admitted on 4/22/2017 from home with a diagnosis of CVA. Current medical issues leading to Palliative Medicine involvement include: 50year old with CVA, seen by neurology and neurosurgery who have discussed with family, mother, daughter and  (by report) and family has decided on no further heroic measures. Palliative medicine is consulted for support and further discussions on care options. PALLIATIVE DIAGNOSES:   1. CVA  2. Feeding difficulties   3. Debility        PLAN:   1. Patient seen at bedside, mother also present. Sleepy, would open eyes eventually to her name, did not track or follow any commands. Comfortable appearing  2. Goals of care Patient is DNR/DNI and on comfort measures. Mayte Guadarrama has not executed an AMD, but has verbally told her mother what would be an acceptable quality of life. Family including estranged  Tasia Motta are in agreement to patient going home with mother on hospice care. Mother is aware Mayte Guadarrama due to her neuro deficits will not likely be able to take enough oral feeds to sustain for a long period of time. Family, supporting Amisha's goals of care do not wish for alternative feeding options. We also discussed with Amisha's mother, she may live for days to weeks, but goal is for her to be as comfortable as possible. Home with hospice later today. 3. CVA LICA occlusion causing large left cerebral hemisphere ischemic stroke.  Has been seen by Dr. Joshua Palm and Neuro, discussed with family. Now on comfort measures. 4. Feeding difficulties, per mother family does not wish for feeding tubes, temporary or permament. Appreciate speech assistance, at risk to aspirate on all textures. Discussed at length with mother risk for aspiration would also happen with feeding tubes. Allow feeds for pleasure and as she tolerates. 5. Debility fully ambulatory before CVA, currently moves left arm, does not follow commands. Will be bed bound, hospice supplying hospital bed. Will go home with mahoney cath. 6. Initial consult note routed to primary continuity provider  7. Communicated plan of care with: Palliative IDT, attending, hospice, mother       GOALS OF CARE:     [====Goals of Care====]  GOALS OF CARE:  Patient / health care proxy stated goals: comfort measures.  Possible home with hospice       TREATMENT PREFERENCES:   Code Status: DNR    Advance Care Planning:  Advance Care Planning 4/23/2017   Patient's Healthcare Decision Maker is: [facility] [address]   Primary Decision Maker Name Lesly Shahid   Primary Decision Maker Phone Number [phone]   Primary Decision Maker Relationship to Patient Parent   Secondary Decision Maker Name -   Secondary Decision Maker Phone Number -   Confirm Advance Directive None       Other:    The palliative care team has discussed with patient / health care proxy about goals of care / treatment preferences for patient.  [====Goals of Care====]    Advance Care Planning 4/23/2017   Patient's Healthcare Decision Maker is: Legal Next of Kin   Primary Decision Maker Name Lesly Shahid   Primary Decision Maker Phone Number [phone]   Primary Decision Maker Relationship to Patient Parent   Secondary Decision Maker Name -   Secondary Decision Maker Phone Number -   Confirm Advance Directive None           HISTORY:     History obtained from: mother, chart     CHIEF COMPLAINT: cva    HPI/SUBJECTIVE:    The patient is:   [] Verbal and participatory  [x] Non-participatory due to: CVA   Please see summary   Home today with hospice    MRI of brain 4/22/2017   Large confluent area of infarct predominantly throughout the left frontal lobe with additional involvement of the left basal ganglia, insula, perisylvian temporal lobe, and small portion of the temporal lobe on this. These regions  correspond to portions of the left MCA and left LUZ territories. Mass effect is present including 5 mm of left-to-right midline shift. Continued close CT  follow-up is recommended as mass effect may worsen with increasing edema.     2. Abnormal flow void involving left internal carotid artery with questionable focus of susceptibility artifact at left carotid terminus/left M1 segment suspicious for thrombus although not definitive. FLAIR hyperintense vessels along left MCA representing slow collateral flow related to proximal high-grade stenosis/occlusion. Correlation with reported prior outside CTA (not available  for review) recommended.     3. Mild mass effect on the left lateral ventricle. Other ventricles within normal limits without hydrocephalus    . Clinical Pain Assessment (nonverbal scale for nonverbal patients): Pain: 0  Adult Non-Verbal Pain Assessment    Face  [x] 0   No particular expression or smile  [] 1   Occasional grimace, tearing, frowning, wrinkled forehead  [] 2   Frequent grimace, tearing, frowning, wrinkled forehead    Activity (movement)  [x] 0   Lying quietly, normal position  [] 1   Seeking attention through movement or slow, cautious movement  [] 2   Restless, excessive activity and/or withdrawal reflexes    Guarding  [x] 0   Lying quietly, no positioning of hands over areas of body  [] 1   Splinting areas of the body, tense  [] 2   Rigid, stiff    Physiology (vital signs)  [x] 0   Stable vital signs  [] 1   Change in any of the following: SBP > 20mm Hg; HR > 20/minute  [] 2   Change in any of the following: SBP > 30mm Hg; HR > 25/minute    Respiratory  [x] 0   Baseline RR/SpO2, compliant with ventilator  [] 1   RR > 10 above baseline, or 5% drop SpO2, mild asynchrony with ventilator  [] 2   RR > 20 above baseline, or 10% drop SpO2, asynchrony with ventilator    Total Non-Verbal Pain Score: 0       FUNCTIONAL ASSESSMENT:     Palliative Performance Scale (PPS): 20%          PSYCHOSOCIAL/SPIRITUAL SCREENING:     Advance Care Planning:  Advance Care Planning 4/23/2017   Patient's Healthcare Decision Maker is: Legal Next of [address]   Primary Decision Maker Name Christopher Taylor   Primary Decision Maker Phone Number [phone]   Primary Decision Maker Relationship to Patient Parent   Secondary Decision Maker Name -   Secondary Decision [address] Phone Number -   Confirm Advance Directive None        Any spiritual / Pentec[employer]al concerns:  [] Yes /  [x] No    Caregiver Burnout:  [] Yes /  [x] No /  [] No Caregiver Present      Anticipatory grief assessment:   [x] Normal  / [] Maladaptive       ESAS Anxiety:      ESAS Depression:          REVIEW OF SYSTEMS:     Positive and pertinent negative findings in ROS are noted above in HPI. The following systems were [] reviewed / [x] unable to be reviewed as noted in HPI  Other findings are noted below. Systems: constitutional, ears/nose/mouth/throat, respiratory, gastrointestinal, genitourinary, musculoskeletal, integumentary, neurologic, psychiatric, endocrine. Positive findings noted below. Modified ESAS Completed by: provider           Pain: 0           Dyspnea: 0                    PHYSICAL EXAM:     Wt Readings from Last 3 Encounters:   04/24/17 60.3 kg (133 lb)   04/22/17 61 kg (134 lb 7.7 oz)   04/10/17 60.3 kg (133 lb)     Blood pressure 127/76, pulse 71, temperature 98.2 °F (36.8 °C), resp. rate 14, height 5' 7\" (1.702 m), weight 60.3 kg (133 lb), last menstrual period 03/28/2017, SpO2 96 %.   Pain:  Pain Scale 1: Adult Nonverbal Pain Scale  Pain Intensity 1: 0                 Last bowel movement: not known    Constitutional: 50year old, who will open eyes to her name, non verbal, does not follow commands, lying in bed in NAD   Eyes: pupils equal, anicteric, gaze deviates to left   Respiratory: breathing not labored,+ cough  Skin: warm, dry  Neurologic: opens eyes to her name, did not track in room for me or family, moves left arm, did not follow commands       HISTORY:     Principal Problem:    LICA occlusion with ischemia and infarct (4/22/2017)    Active Problems:    Essential hypertension (4/10/2017)      LACA occlusion with ischemia and infarct (4/22/2017)      LMCA occlusion with ischemia and infarct (4/22/2017)      Hypercholesterolemia (4/23/2017)      GERD (gastroesophageal reflux disease) (4/23/2017)      Smoking (4/23/2017)      Cerebral edema (HCC) (4/23/2017)      Brain compression ([address] (4/23/2017)      Feeding difficulties (4/24/2017)      Debility (4/24/2017)      Past Medical History:   Diagnosis Date    Alcoholism ([address]     Anemia NEC     Anxiety     Arthralgia     Cerebral infarction due to occlusion of left internal carotid artery ([address] 4/22/2017    Constipation     Depression     Fatigue     for more than 11 years    Fibromyalgia     GERD (gastroesophageal reflux disease)     Hypercholesterolemia     Hypertension     Leukocytosis     Migraines     Nodule of left lung     on CT scan      Past Surgical History:   Procedure Laterality Date    ABDOMEN SURGERY PROC UNLISTED  11/2007    Mini abdominoplasty    HX BREAST AUGMENTATION  1994    HX PELVIC LAPAROSCOPY      for endometriosis      Family History   Problem Relation Age of Onset    COPD Father     High Cholesterol Mother     Other Mother      Reflux    Cancer Other      colon     History reviewed, no pertinent family history.   Social History   Substance Use Topics    Smoking status: Current Every Day Smoker     Packs/day: 0.50    Smokeless tobacco: Current User    Alcohol use No      Comment: Currently on cessation. Allergies   Allergen Reactions    Lisinopril Cough    Penicillins Rash      Current Facility-Administered Medications   Medication Dose Route Frequency    morphine (ROXANOL) 100 mg/5 mL (20 mg/mL) concentrated solution 5 mg  5 mg Oral Q2H PRN    ondansetron (ZOFRAN) injection 4 mg  4 mg IntraVENous Q6H PRN    haloperidol (HALDOL) 2 mg/mL oral solution 2 mg  2 mg Oral Q4H PRN    promethazine (PHENERGAN) suppository 25 mg  25 mg Rectal Q6H PRN    scopolamine (TRANSDERM-SCOP) 1.5 mg  1.5 mg TransDERmal Q72H    glycopyrrolate (ROBINUL) injection 0.2 mg  0.2 mg IntraVENous Q6H PRN    LORazepam (INTENSOL) 2 mg/mL oral concentrate 1 mg  1 mg Oral Q4H PRN    acetaminophen (TYLENOL) suppository 650 mg  650 mg Rectal Q4H PRN        LAB AND IMAGING FINDINGS:     Lab Results   Component Value Date/Time    WBC 13.3 04/23/2017 04:30 AM    HGB 12.8 04/23/2017 04:30 AM    PLATELET 443 74/35/2830 04:30 AM     Lab Results   Component Value Date/Time    Sodium 143 04/23/2017 10:08 AM    Potassium 3.6 04/23/2017 04:30 AM    Chloride 104 04/23/2017 04:30 AM    CO2 24 04/23/2017 04:30 AM    BUN 7 04/23/2017 04:30 AM    Creatinine 0.72 04/23/2017 04:30 AM    Calcium 8.3 04/23/2017 04:30 AM    Magnesium 2.0 04/23/2017 04:30 AM    Phosphorus 1.9 04/23/2017 04:30 AM      Lab Results   Component Value Date/Time    AST (SGOT) 42 04/23/2017 04:30 AM    Alk.  phosphatase 107 04/23/2017 04:30 AM    Protein, total 6.1 04/23/2017 04:30 AM    Albumin 3.4 04/23/2017 04:30 AM    Globulin 2.7 04/23/2017 04:30 AM     Lab Results   Component Value Date/Time    INR 1.2 04/23/2017 04:30 AM    Prothrombin time 14.6 04/23/2017 04:30 AM    aPTT 42.7 04/23/2017 04:30 AM      No results found for: IRON, FE, TIBC, IBCT, PSAT, FERR   No results found for: PH, PCO2, PO2  No components found for: GLPOC   No results found for: CPK, CKMB           Total time: 35 minutes   Counseling / coordination time: 25 minutes   > 50% counseling / coordination?: yes     Prolonged service was provided for  []30 min   []75 min in face to face time in the presence of the patient. Time Start:   Time End:   Note: this can only be billed with 46599 (initial) or 95773 (follow up). If multiple start / stop times, list each separately.

## 2017-04-25 NOTE — PROGRESS NOTES
Assumed care of patient, patient resting comfortably. Due to be d/c today. [phone] patient d/c. Life care picked up patient. Family at bedside. D/c instructions viewed with mother. Patient is going home to mother house, mother is POA. Patients Picc line and peripheral lines removed. Armband removed.

## 2017-04-25 NOTE — PROGRESS NOTES
Problem: Dysphagia (Adult)  Goal: *Acute Goals and Plan of Care (Insert Text)  Dysphagia Present: No    Recommendations:  Diet: pt not safe for po feeds at this time, however, given situation, safest diet would be clears via half tsp, diet for quality of life with an understanding of risks and complications associated. Meds: per Hospice team  Aspiration Precautions      Patient will:  1. Participate in training and education related to continued aspiration risk, diet recs and compensatory strategies (goal met). Outcome: Resolved/Met Date Met:  04/25/17  SPEECH LANGUAGE PATHOLOGY BEDSIDE SWALLOW EVALUATION AND DISCHARGE     Patient: Franky Meyer (20 y.o. female)  Date: 4/25/2017  Primary Diagnosis: Acute Ischemic Left LUZ stroke  Acute ischemic left MCA stroke [facility])        Precautions: aspiration         ASSESSMENT :  Clinical beside swallow eval completed per MD orders for safest po feeds for quality of life. Pt lethargic, aroused to light touch, light touch and ice chip to lips. Pt with severely decreased lingual/ labial strength/ rom/ coordination. Pt accepted thin via half tsp x 6, puree via 1/8 tsp x 1. Pt able to sip thin from tsp when presented on 4/6 trials. Additional trials spilled anteriorly. Pt with delayed oral onset with limited lingual movement, intermittent oral holding with suspected premature spillage of all material into pharynx, slow A-P transit with severely delayed swallow response (12- 15 seconds) and severely decreased laryngeal elevation. Pt without swallow response following puree trial, swallow initiated only following liquid wash via half tsp. Pt with severely delayed cough response. Given previous, pt not safe for po feeds at this time, however, given pt transition to Hospice, would rec diet for quality of life. Educated pt's mother to above with safest po feeds at current, thin liquids via half tsp, no solids, watch for pt to swallow prior to giving additional trials. Spoke re: risks of continued aspiration of any presentations. Comprehension expressed, questions answered. SLP will sign off at current. Will be available for re-evaluation if indicated by MD. Results d/w RN. Thank you for this referral.   Minoo Freeze       PLAN :  Recommendations and Planned Interventions:  Safest diet determined, skilled ST services not warranted at this time. Eval only.    Discharge Recommendations: Hospice care       SUBJECTIVE:   Patient stated pt non verbal      OBJECTIVE:       Past Medical History:   Diagnosis Date    [address]ism ([facility]      Anemia NEC      Anxiety      Arthralgia      Cerebral infarction due to occlusion of left internal carotid artery (HCC) 4/22/2017    Constipation      Depression      Fatigue       for more than 11 years    Fibromyalgia      GERD (gastroesophageal reflux disease)      Hypercholesterolemia      Hypertension      Leukocytosis      Migraines      Nodule of left lung       on CT scan     Past Surgical History:   Procedure Laterality Date    ABDOMEN SURGERY PROC UNLISTED   11/2007     Mini abdominoplasty    HX BREAST AUGMENTATION   1994    HX PELVIC LAPAROSCOPY         for endometriosis     Prior Level of Function/Home Situation: lived independently, no deficits  Home Situation  Home Environment: Private residence  One/Two Story Residence: One story  Living Alone: No  Support Systems: Parent, Spouse/Significant Other/Partner  Patient Expects to be Discharged to[de-identified] Private residence  Current DME Used/Available at Home: None  Diet prior to admission: regular/ thin, no restrictions  Current Diet:  NPO   Cognitive and Communication Status:  Neurologic State: Drowsy, Eyes open to stimulus  Orientation Level: Unable to verbalize  Cognition: Unable to assess (comment)  Perception:  (severe right neglect)  Perseveration: No perseveration noted  Safety/Judgement: Not assessed (unable to assess due to level severity)  Oral Assessment:  Oral Assessment  Labial: Decreased rate;Decreased seal;Flaccid; Impaired coordination  Dentition: Intact; Natural  Oral Hygiene: oral secretions  Lingual: Decreased rate;Decreased strength;Flaccid; Incoordinated  Velum: Unable to visualize  P.O. Trials:  Patient Position: 90* in bed  Vocal quality prior to P.O.: Aphonic  Consistency Presented: Ice chips;Puree  How Presented: SLP-fed/presented;Spoon     Bolus Acceptance: Impaired  Bolus Formation/Control: Impaired  Type of Impairment: Anterior;Delayed; Incomplete;Premature spillage;Lip closure  Propulsion: Delayed (# of seconds); Discoordination  Oral Residue: Greater than 50% of bolus (with puree, 10- 50% with water via half tsp)  Initiation of Swallow: Delayed (# of seconds) (12-15 seconds)  Laryngeal Elevation: Weak;Decreased (severe)  Aspiration Signs/Symptoms: Delayed cough/throat clear;Strong cough  Pharyngeal Phase Characteristics: Audible swallow;Easily fatigued ; Suspected pharyngeal residue (gurgly breath sounds)  Effective Modifications: None  Cues for Modifications: Maximal        Oral Phase Severity: Other (comment) (profound)  Pharyngeal Phase Severity : Other (comment) (profound)     GCODESwallowing:  Swallow Current Status CN= 100%   Swallow Goal Status CN= 100%   Swallow D/C Status CN= 100%     The severity rating is based on the following outcomes:  MARISOL Noms Swallow Level 1              Clinical Judgement        PAIN:  Start of Eval: pt unable to verbalize  End of Eval: pt unable to verbalize        After evaluation:   [X]            Patient left in no apparent distress sitting up in chair  [ ]            Patient left in no apparent distress in bed  [X]            Call bell left within reach  [X]            Nursing notified  [X]            Family present  [X]            Caregiver present  [ ]            Bed alarm activated         COMMUNICATION/EDUCATION:   [X]            Aspiration precautions; swallow safety; compensatory techniques.   [X] Patient/family have participated as able in goal setting and plan of care. [ ]            Patient/family agree to work toward stated goals and plan of care. [ ]            Patient understands intent and goals of therapy; neutral about participation. [ ]            Patient unable to participate in goal setting/plan of care; educ ongoing with interdisciplinary staff  [ ]             Posted safety precautions in patient's room.      Thank you for this referral.  Yahaira Cleary  Time Calculation: 42 mins

## 2017-04-25 NOTE — PROGRESS NOTES
Pt. Care received. Resting comfortably in bed. Patient unresponsive. No signs and symptoms of pain. Call light within reach and bed in lowest position and locked. Patient stable. 1120 patient's mother left to go home and get furniture moved around so that her living room would be ready for bed delivery. She stated she would be back. 1300 Verbal and Written shift change report given to Charline Felty, RN (oncoming nurse) by Jaspal Umaña RN (offgoing nurse). Report included the following information SBAR, Kardex and MAR.

## 2017-04-25 NOTE — DISCHARGE SUMMARY
Discharge Summary    Patient: Bandar Mosley               Sex: female          DOA: 4/22/2017         YOB: 1970      Age:  50 y.o.        LOS:  LOS: 3 days                Admit Date: 4/22/2017    Discharge Date: 4/25/2017    Discharge Medications:     Current Discharge Medication List      START taking these medications    Details   haloperidol (HALDOL) 2 mg/mL oral concentrate Take 1 mL by mouth every four (4) hours as needed. Qty: 30 mL, Refills: 0      morphine (ROXANOL) 100 mg/5 mL (20 mg/mL) concentrated solution Take 0.25 mL by mouth every two (2) hours as needed. Max Daily Amount: 60 mg.  Qty: 30 mL, Refills: 0      LORazepam (INTENSOL) 2 mg/mL concentrated solution Take 0.5 mL by mouth every four (4) hours as needed. Max Daily Amount: 6 mg. Qty: 30 mL, Refills: 0      promethazine (PHENERGAN) 25 mg suppository Insert 1 Suppository into rectum every six (6) hours as needed for up to 7 days. Qty: 30 Suppository, Refills: 0      scopolamine (TRANSDERM-SCOP) 1.5 mg (1 mg over 3 days) pt3d 1 Patch by TransDERmal route every seventy-two (72) hours. Qty: 10 Patch, Refills: 0         CONTINUE these medications which have NOT CHANGED    Details   sertraline (ZOLOFT) 100 mg tablet Take 100 mg by mouth two (2) times a day.  Indications: ANXIETY WITH DEPRESSION         STOP taking these medications       hydroCHLOROthiazide (MICROZIDE) 12.5 mg capsule Comments:   Reason for Stopping:         promethazine (PHENERGAN) 25 mg tablet Comments:   Reason for Stopping:         ALPRAZolam (XANAX) 1 mg tablet Comments:   Reason for Stopping:         zolpidem (AMBIEN) 10 mg tablet Comments:   Reason for Stopping:         aspirin delayed-release 325 mg tablet Comments:   Reason for Stopping:         atorvastatin (LIPITOR) 80 mg tablet Comments:   Reason for Stopping:         levETIRAcetam in NaCl, iso-os, (KEPPRA) 1,000 mg/100 mL pgbk IVPB premix Comments:   Reason for Stopping:         mannitol (OSMITROL) 20 % infusion Comments:   Reason for Stopping: Follow-up: hospice    Discharge Condition: Stable    Activity: Activity as tolerated    Diet: Comfort feeding    Labs:  Labs: Results:       Chemistry Recent Labs      04/23/17   1008  04/23/17 0430   GLU   --   95   NA  143  138   K   --   3.6   CL   --   104   CO2   --   24   BUN   --   7   CREA   --   0.72   CA   --   8.3*   AGAP   --   10   BUCR   --   10*   AP   --   107   TP   --   6.1*   ALB   --   3.4   GLOB   --   2.7   AGRAT   --   1.3      CBC w/Diff Recent Labs      04/23/17 0430   WBC  13.3*   RBC  3.36*   HGB  12.8   HCT  36.8   PLT  318   GRANS  83*   LYMPH  12*   EOS  0      Cardiac Enzymes No results for input(s): CPK, CKND1, COLBY in the last 72 hours. No lab exists for component: CKRMB, TROIP   Coagulation Recent Labs      04/23/17 0430   PTP  14.6   INR  1.2   APTT  42.7*       Lipid Panel Lab Results   Component Value Date/Time    Cholesterol, total 198 04/22/2017 10:45 PM    HDL Cholesterol 41 04/22/2017 10:45 PM    LDL, calculated 131.4 04/22/2017 10:45 PM    VLDL, calculated 25.6 04/22/2017 10:45 PM    Triglyceride 128 04/22/2017 10:45 PM    CHOL/HDL Ratio 4.8 04/22/2017 10:45 PM      BNP No results for input(s): BNPP in the last 72 hours. Liver Enzymes Recent Labs      04/23/17 0430   TP  6.1*   ALB  3.4   AP  107   SGOT  42*      Thyroid Studies Lab Results   Component Value Date/Time    TSH 0.66 04/22/2017 10:45 PM          Imaging:     CT head  Large area of infarct involving the left frontal lobe, adjacent white matter,  left-sided basal ganglia, and likely anterior aspects of the medial left  temporal lobe along the sylvian fissure, without evidence of hemorrhagic  transformation. Diffuse sulcal effacement noted locally, with mass effect upon  the left lateral ventricle, and left-to-right midline shift of approximately 5  to 6 mm.  No evidence of asymmetric enlargement of the temporal horn      Consults: Neurology    Treatment Team: Treatment Team: Attending Provider: Albert Rowley MD; Physical Therapist: Nick Banda PT; Utilization Review: Sandra Harris, ARISTEO; Care Manager: Rafaela Streeter, RN; Care Manager: Мария Sheikh RN    Significant Diagnostic Studies: labs: No results found for this or any previous visit (from the past 24 hour(s)). Discharge diagnoses:    Problem List as of 4/25/2017  Never Reviewed          Codes Class Noted - Resolved    Feeding difficulties ICD-10-CM: R63.3  ICD-9-CM: 783.3  4/24/2017 - Present        Debility ICD-10-CM: R53.81  ICD-9-CM: 799.3  4/24/2017 - Present        Hypercholesterolemia ICD-10-CM: E78.00  ICD-9-CM: 272.0  4/23/2017 - Present        GERD (gastroesophageal reflux disease) ICD-10-CM: K21.9  ICD-9-CM: 530.81  4/23/2017 - Present        Smoking ICD-10-CM: F17.200  ICD-9-CM: 305.1  4/23/2017 - Present        Cerebral edema ([facility] [address] ICD-10-CM: G93.6  ICD-9-CM: 348.5  4/23/2017 - Present        Brain compression ([facility] [address] ICD-10-CM: G93.5  ICD-9-CM: 348.4  4/23/2017 - Present        LACA occlusion with ischemia and infarct ICD-10-CM: B20.621  ICD-9-CM: 434.91  4/22/2017 - Present        * (Principal)LICA occlusion with ischemia and infarct ICD-10-CM: L93.908  ICD-9-CM: 433.11  4/22/2017 - Present        LMCA occlusion with ischemia and infarct ICD-10-CM: I63.512  ICD-9-CM: 434.91  4/22/2017 - Present        Essential hypertension ICD-10-CM: I10  ICD-9-CM: 401.9  4/10/2017 - Present            Hospital Course:   Major issues addressed during hospitalization outlined  below. Mey Lieberman is a 50 y.o. female With hx of deperession, HTN, alocohol abuse in the past, HLP, current 2PD smoker; she was found unresponsive, last time seen normal was last night before going to bed. Mrs. Gimenez lives with her boyfriend and today she would not wake up. In the ED, Ct showed large left ICA stroke, CTA showed ICA occlusion. Mrs. Gimenez is unresponsive and unable to provide any history. Patient was transferred here for neurosurgical services. Patient was seen by neurology Dr. Flash Steinberg whom recommended hemicraniectomy and consulted neurosurgery. Neurosurgery discussed with family and it was decided patient should be comfort care. Comfort orderset was placed and on Monday palliative care was consulted. Home hospice then evaluated patient and set up equipment before dc on 4/25.       Clark Pearosn MD  April 25, 2017        Total time spent 35 minutes

## 2017-04-25 NOTE — PROGRESS NOTES
visited with patients mother and we talked about future plans and  arrangements for her daughter. Patient will be going home to her mothers house for the duration of this event until death does come. Prayer and support offered.

## 2017-04-25 NOTE — DISCHARGE INSTRUCTIONS
DISCHARGE SUMMARY from Nurse    The following personal items are in your possession at time of discharge:    Dental Appliances: None  Visual Aid: None     Home Medications: Sent home  Jewelry: None  Clothing: None  Other Valuables: None             PATIENT INSTRUCTIONS:    After general anesthesia or intravenous sedation, for 24 hours or while taking prescription Narcotics:  · Limit your activities  · Do not drive and operate hazardous machinery  · Do not make important personal or business decisions  · Do  not drink alcoholic beverages  · If you have not urinated within 8 hours after discharge, please contact your surgeon on call. Report the following to your surgeon:  · Excessive pain, swelling, redness or odor of or around the surgical area  · Temperature over 100.5  · Nausea and vomiting lasting longer than 4 hours or if unable to take medications  · Any signs of decreased circulation or nerve impairment to extremity: change in color, persistent  numbness, tingling, coldness or increase pain  · Any questions        What to do at Home:  Recommended activity: Activity as tolerated. *  Please give a list of your current medications to your Primary Care Provider. *  Please update this list whenever your medications are discontinued, doses are      changed, or new medications (including over-the-counter products) are added. *  Please carry medication information at all times in case of emergency situations. These are general instructions for a healthy lifestyle:    No smoking/ No tobacco products/ Avoid exposure to second hand smoke    Surgeon General's Warning:  Quitting smoking now greatly reduces serious risk to your health.     Obesity, smoking, and sedentary lifestyle greatly increases your risk for illness    A healthy diet, regular physical exercise & weight monitoring are important for maintaining a healthy lifestyle    You may be retaining fluid if you have a history of heart failure or if you experience any of the following symptoms:  Weight gain of 3 pounds or more overnight or 5 pounds in a week, increased swelling in our hands or feet or shortness of breath while lying flat in bed. Please call your doctor as soon as you notice any of these symptoms; do not wait until your next office visit. Recognize signs and symptoms of STROKE:    F-face looks uneven    A-arms unable to move or move unevenly    S-speech slurred or non-existent    T-time-call 911 as soon as signs and symptoms begin-DO NOT go       Back to bed or wait to see if you get better-TIME IS BRAIN. Warning Signs of HEART ATTACK     Call 911 if you have these symptoms:   Chest discomfort. Most heart attacks involve discomfort in the center of the chest that lasts more than a few minutes, or that goes away and comes back. It can feel like uncomfortable pressure, squeezing, fullness, or pain.  Discomfort in other areas of the upper body. Symptoms can include pain or discomfort in one or both arms, the back, neck, jaw, or stomach.  Shortness of breath with or without chest discomfort.  Other signs may include breaking out in a cold sweat, nausea, or lightheadedness. Don't wait more than five minutes to call 911 - MINUTES MATTER! Fast action can save your life. Calling 911 is almost always the fastest way to get lifesaving treatment. Emergency Medical Services staff can begin treatment when they arrive -- up to an hour sooner than if someone gets to the hospital by car. The discharge information has been reviewed with the patient. The patient verbalized understanding. Discharge medications reviewed with the patient and appropriate educational materials and side effects teaching were provided. Transient Ischemic Attack: Care Instructions  Your Care Instructions    A transient ischemic attack (TIA) is when blood flow to a part of your brain is blocked for a short time.  A TIA is like a stroke but usually lasts only a few minutes. A TIA does not cause lasting brain damage. Any vision problems, slurred speech, or other symptoms usually go away in 10 to 20 minutes. But they may last for up to 24 hours. TIAs are often warning signs of a stroke. Some people who have a TIA may have a stroke in the future. A stroke can cause symptoms like those of a TIA. But a stroke causes lasting damage to your brain. You can take steps to help prevent a stroke. One thing you can do is get early treatment. If you have other new symptoms, or if your symptoms do not get better, go back to the emergency room or call your doctor right away. Getting treatment right away may prevent long-term brain damage caused by a stroke. The doctor has checked you carefully, but problems can develop later. If you notice any problems or new symptoms, get medical treatment right away. Follow-up care is a key part of your treatment and safety. Be sure to make and go to all appointments, and call your doctor if you are having problems. It's also a good idea to know your test results and keep a list of the medicines you take. How can you care for yourself at home? Medicines  · Be safe with medicines. Take your medicines exactly as prescribed. Call your doctor if you think you are having a problem with your medicine. · If you take a blood thinner, such as aspirin, be sure you get instructions about how to take your medicine safely. Blood thinners can cause serious bleeding problems. · Call your doctor if you are not able to take your medicines for any reason. · Do not take any over-the-counter medicines or herbal products without talking to your doctor first.  · If you take birth control pills or hormone therapy, talk to your doctor. Ask if these treatments are right for you. Lifestyle changes  · Do not smoke. If you need help quitting, talk to your doctor about stop-smoking programs and medicines. · Be active.  If your doctor recommends it, get more exercise. Walking is a good choice. Bit by bit, increase the amount you walk every day. Try for at least 30 minutes on most days of the week. You also may want to swim, bike, or do other activities. · Eat heart-healthy foods. These include fruits, vegetables, high-fiber foods, fish, and foods that are low in sodium, saturated fat, and trans fat. · Stay at a healthy weight. Lose weight if you need to. · Limit alcohol to 2 drinks a day for men and 1 drink a day for women. Staying healthy  · Manage other health problems such as diabetes, high blood pressure, and high cholesterol. · Get the flu vaccine every year. When should you call for help? Call 911 anytime you think you may need emergency care. For example, call if:  · You have new or worse symptoms of a stroke. These may include:  ¨ Sudden numbness, tingling, weakness, or loss of movement in your face, arm, or leg, especially on only one side of your body. ¨ Sudden vision changes. ¨ Sudden trouble speaking. ¨ Sudden confusion or trouble understanding simple statements. ¨ Sudden problems with walking or balance. ¨ A sudden, severe headache that is different from past headaches. Call 911 even if these symptoms go away in a few minutes. · You feel like you are having another TIA. Watch closely for changes in your health, and be sure to contact your doctor if you have any problems. Where can you learn more? Go to http://diego-britton.info/. Enter ([id number] in the search box to learn more about \"Transient Ischemic Attack: Care Instructions. \"  Current as of: June 4, 2016  Content Version: 11.2  © 0900-6241 [employer]. Care instructions adapted under license by [employer] (which disclaims liability or warranty for this information).  If you have questions about a medical condition or this instruction, always ask your healthcare professional. [place] [address] any warranty or liability for your use of this information. Learning About a Hemorrhagic Stroke  What is a hemorrhagic stroke? When you have a hemorrhagic (say \"gap-ahk-RJ-kristine\") stroke, it means that a blood vessel in the brain has burst open or has started to leak. When the blood spills into the space inside and around the brain, it damages nearby nerve cells. This is different from an ischemic (say \"flb-JQM-gohu\") stroke, which happens when a blood clot blocks a blood vessel in the brain. The brain damage from a stroke starts within minutes. Quick treatment can help limit damage to the brain and make recovery more likely. People who have had a stroke may have a hard time talking, understanding things, and making decisions. They may have to relearn daily activities, such as how to eat, bathe, and dress. How well someone recovers from a stroke depends on how quickly the person gets to the hospital, where in the brain the stroke happened, and how severe it was. Stroke rehabilitation, which includes training and therapy, also helps people recover. What are the symptoms? If you have any of these symptoms, call 911 or other emergency services right away. · You have symptoms of a stroke. These may include:  ¨ Sudden numbness, tingling, weakness, or loss of movement in your face, arm, or leg, especially on only one side of your body. ¨ Sudden vision changes. ¨ Sudden trouble speaking. ¨ Sudden confusion or trouble understanding simple statements. ¨ Sudden problems with walking or balance. ¨ A sudden, severe headache that is different from past headaches. See your doctor if you have symptoms that seem like a stroke, even if they go away quickly. You may have had a transient ischemic attack (TIA), sometimes called a mini-stroke. A TIA is a warning that a stroke may happen soon. Getting early treatment for a TIA can help prevent a stroke. What causes a hemorrhagic stroke?   A hemorrhagic stroke happens to blood vessels that have been weakened. The most common causes of weakened blood vessels in the brain are:  · A brain aneurysm. This is a bulging, weak part of a blood vessel. It can put pressure on nerves, or it can bleed or break open (rupture). · A brain AVM. This is an abnormal knot of weak blood vessels that some people are born with. · Head injury. · Chronic uncontrolled high blood pressure. Blood pressure that is too high over the long term increases your risk for stroke. · Very high blood pressure. Very high blood pressure that comes on suddenly is dangerous. It is a medical emergency. Anticoagulant and antiplatelet medicines can also cause bleeding in the brain. These medicines, also called blood thinners, increase the time it takes for a blood clot to form. How is hemorrhagic stroke treated? Emergency treatment is done to stop the bleeding and prevent damage to the brain. · You may need surgery to repair an aneurysm or to remove the blood that has built up inside the brain. · You may be given medicine to stop the bleeding. · You will be closely watched for signs of increased pressure on the brain. These signs include restlessness, confusion, trouble following commands, and headache. · You may take medicine to manage high blood pressure. Ask your doctor if a stroke rehab program is right for you. Rehab increases your chances of getting back some of the abilities you lost.  Follow-up care is a key part of your treatment and safety. Be sure to make and go to all appointments, and call your doctor if you are having problems. It's also a good idea to know your test results and keep a list of the medicines you take. How can you prevent another stroke? · Work with your doctor to treat health problems, such as high blood pressure, that raise your chances of having another stroke. · Be safe with medicines. Take your medicine exactly as prescribed. Call your doctor if you think you are having a problem with your medicine.   · Have a healthy lifestyle. ¨ Do not smoke or allow others to smoke around you. If you need help quitting, talk to your doctor about stop-smoking programs and medicines. These can increase your chances of quitting for good. Smoking makes a stroke more likely. ¨ Limit alcohol to 2 drinks a day for men and 1 drink a day for women. ¨ Be active. Ask your doctor what type and level of activity is safe for you. ¨ Eat heart-healthy foods, like fruits, vegetables, and high-fiber foods. ¨ Stay at a healthy weight. Lose weight if you need to. Where can you learn more? Go to http://diegoTalking Media Groupbritton.info/. Enter R416 in the search box to learn more about \"Learning About a Hemorrhagic Stroke. \"  Current as of: June 4, 2016  Content Version: 11.2  © 1120-9941 [employer]. Care instructions adapted under license by [employer] (which disclaims liability or warranty for this information). If you have questions about a medical condition or this instruction, always ask your healthcare professional. [place] [address] any warranty or liability for your use of this information. Persado Activation    Thank you for requesting access to Persado. Please follow the instructions below to securely access and download your online medical record. Persado allows you to send messages to your doctor, view your test results, renew your prescriptions, schedule appointments, and more. How Do I Sign Up? 1. In your internet browser, go to [email or web address]  2. Click on the First Time User? Click Here link in the Sign In box. You will be redirect to the New Member Sign Up page. 3. Enter your Persado Access Code exactly as it appears below. You will not need to use this code after youve completed the sign-up process. If you do not sign up before the expiration date, you must request a new code. Persado Access Code:  Activation code not generated  Current Persado Status: Active (This is the date your Stripe access code will )    4. Enter the last four digits of your Social Security Number (xxxx) and Date of Birth (mm/dd/yyyy) as indicated and click Submit. You will be taken to the next sign-up page. 5. Create a Psonart ID. This will be your Stripe login ID and cannot be changed, so think of one that is secure and easy to remember. 6. Create a Stripe password. You can change your password at any time. 7. Enter your Password Reset Question and Answer. This can be used at a later time if you forget your password. 8. Enter your e-mail address. You will receive e-mail notification when new information is available in [address] 9. Click Sign Up. You can now view and download portions of your medical record. 10. Click the Download Summary menu link to download a portable copy of your medical information. Additional Information    If you have questions, please visit the Frequently Asked Questions section of the Stripe website at [email or web address] Remember, Stripe is NOT to be used for urgent needs. For medical emergencies, dial 911.     Patient armband removed and shredded

## 2017-04-25 NOTE — PROGRESS NOTES
Call to [facility]. Spoke with [employer]. Requested update on the status of the hospice referral.  She reported she fernie f/u.

## 2017-04-25 NOTE — PROGRESS NOTES
Care Management Interventions  PCP Verified by CM: Yes  Mode of Transport at Discharge: Other (see comment) (family)  Transition of Care Consult (CM Consult): Discharge Planning, Christian: Yes  Discharge Durable Medical Equipment: No  Physical Therapy Consult: Yes  Occupational Therapy Consult: Yes  Speech Therapy Consult: Yes  Current Support Network:  Other (lives with boyfriend)  Plan discussed with Pt/Family/Caregiver: Yes  Freedom of Choice Offered: Yes  Discharge Location  Discharge Placement: Home with hospice